# Patient Record
Sex: MALE | Race: BLACK OR AFRICAN AMERICAN | Employment: OTHER | URBAN - METROPOLITAN AREA
[De-identification: names, ages, dates, MRNs, and addresses within clinical notes are randomized per-mention and may not be internally consistent; named-entity substitution may affect disease eponyms.]

---

## 2018-09-28 ENCOUNTER — OFFICE VISIT (OUTPATIENT)
Dept: HEMATOLOGY/ONCOLOGY | Facility: CLINIC | Age: 68
End: 2018-09-28
Payer: OTHER GOVERNMENT

## 2018-09-28 VITALS
OXYGEN SATURATION: 97 % | HEIGHT: 68 IN | RESPIRATION RATE: 18 BRPM | WEIGHT: 147.69 LBS | DIASTOLIC BLOOD PRESSURE: 82 MMHG | SYSTOLIC BLOOD PRESSURE: 130 MMHG | BODY MASS INDEX: 22.38 KG/M2 | TEMPERATURE: 98 F | HEART RATE: 71 BPM

## 2018-09-28 DIAGNOSIS — E03.9 ACQUIRED HYPOTHYROIDISM: ICD-10-CM

## 2018-09-28 DIAGNOSIS — Z90.02 S/P LARYNGECTOMY: ICD-10-CM

## 2018-09-28 DIAGNOSIS — R91.8 PULMONARY NODULES: ICD-10-CM

## 2018-09-28 DIAGNOSIS — Z93.1 STATUS POST INSERTION OF PERCUTANEOUS ENDOSCOPIC GASTROSTOMY (PEG) TUBE: ICD-10-CM

## 2018-09-28 DIAGNOSIS — Z85.21 HISTORY OF CANCER OF LARYNX: ICD-10-CM

## 2018-09-28 PROCEDURE — 99205 OFFICE O/P NEW HI 60 MIN: CPT | Mod: S$PBB,,, | Performed by: INTERNAL MEDICINE

## 2018-09-28 PROCEDURE — 99999 PR PBB SHADOW E&M-NEW PATIENT-LVL III: CPT | Mod: PBBFAC,,, | Performed by: INTERNAL MEDICINE

## 2018-09-28 PROCEDURE — 99203 OFFICE O/P NEW LOW 30 MIN: CPT | Mod: PBBFAC | Performed by: INTERNAL MEDICINE

## 2018-09-28 RX ORDER — IBUPROFEN 600 MG/1
600 TABLET ORAL
COMMUNITY
End: 2018-09-28 | Stop reason: CLARIF

## 2018-09-28 RX ORDER — IBUPROFEN 200 MG
200 TABLET ORAL EVERY 6 HOURS PRN
COMMUNITY

## 2018-09-28 NOTE — PROGRESS NOTES
MAIN COMPLAINT:  We are [facility] by the [facility] to evaluate this 68-year-old   -American gentleman in regard to his history of recurrent laryngeal   cancer.    HISTORY OF PRESENT ILLNESS:  This is a 68-year-old American gentleman who tells   me that in 1999 he was diagnosed as having a localized cancer of the vocal cords   and underwent a complete laryngectomy    Apparently, he had local recurrence in   2015.  He required radiation and chemotherapy.  The patient tells me that recent   lab tests have showed he is hypothyroid and he was prescribed Synthroid.  He   has not taken it because he cannot swallow and is unsure whether he could put it   down the PEG tube.    He seems to have had a somewhat erratic oncologic followup.    He says he had not seen an oncologist for the last eight months and he requested   that he be sent to one.  He lives in the [facility] and this is the nearest   place with an oncologist.    The patient says that following the radiation and chemotherapy, he has been left   with inability to swallow; therefore, he has a PEG tube.  He talks by using a   special voice amplifier.    He says he had two cycles of chemotherapy, but does not know the type.  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    CT scans have shown that he has multiple pulmonary nodules at least since 2016,   but he tells me that subsequent CTs and PET scans have showed stability in size   and number.    ALLERGIES:  None.    MEDICATIONS:  Ibuprofen.    PREVIOUS SURGERIES:  Laryngectomy in 1999.  PEG tube.    SOCIAL HISTORY:  He is .  has no children.  He lives alone a few miles   from [place], [place].  He smoked off and on briefly, but has not smoked   since 1998.  He denies drinking.  He was a .    FAMILY HISTORY:  No cancer or diabetes.  Several uncles have heart attacks.    PAST MEDICAL HISTORY:  1.  Status post total laryngectomy for cancer of the vocal cord in  1999.  2.  Recurrence in 2015, treated with chemo and radiation therapy with apparently   good response.  3.  History of hypothyroidism.    REVIEW OF SYSTEMS:  GENERAL:  No weight loss or fatigue.  EYE:  No vision problems.  OROPHARYNX:  The patient is unable to swallow and has a PEG tube.  He is very   hoarse and communicates by using a voice amplifier.  LUNGS:  No shortness of breath or coughing.  CARDIOVASCULAR:  No chest pains or palpitation.  GASTROINTESTINAL:  No nausea, vomiting or diarrhea.  He has a PEG tube.  GENITOURINARY:  No hematuria, kidney stones or kidney or bladder problems.  NEUROLOGIC:  No headaches or seizures.  PSYCHIATRIC:  No mood swings or depression.    PHYSICAL EXAMINATION:  GENERAL:  He was well groomed.  He interacted normally during the interview.  He   communicated using a voice amplifier.  EYES:  No jaundice or paleness.  OROPHARYNX:No exudates,  no ulcers.  ENDOCRINE:  No palpable thyroid.  LYMPHATIC:  No cervical or supraclavicular adenopathy.  NECK:  No jugular venous distension or masses.  LUNGS:  Clear, well ventilated without rales, wheezes, or rhonchi.  CARDIOVASCULAR:  The heart was rhythmic.  There were no murmurs or gallops.  ABDOMEN:  Soft.  No obvious hepatosplenomegaly, guarding or rebound.  EXTREMITIES:  No edema.  Dorsalis pedis and posterior tibialis pulses.  SKIN:  No rashes or ecchymosis.  NEUROLOGIC:  Coordination, strength and gait were normal.  PSYCHIATRIC:  Mood was appropriate.  Lucid and oriented x3.    DISCUSSION:  The information regarding his treatment is incomplete.  We will   request information regarding his chemoradiation therapy be given to us along   with a copy of his most recent biopsy.    He needs to have a CBC, CMP, T4, TSH and CT scans of the neck and lungs.    He tells me that all his lab tests and imaging studies have to be done through   the [facility], so we will give him a handwritten request for   those lab tests and imaging studies    to be  done.  I will see him in two to three weeks once he completes the tests.      MIKALF/IN  dd: 09/28/2018 14:34:10 (CDT)  td: 09/28/2018 15:35:17 (CDT)  Doc ID   #[id number]  Job ID #[id number]    CC:

## 2021-01-05 ENCOUNTER — HISTORICAL (OUTPATIENT)
Dept: ADMINISTRATIVE | Facility: HOSPITAL | Age: 71
End: 2021-01-05

## 2021-01-05 LAB
ABS NEUT (OLG): 0.88 X10(3)/MCL (ref 2.1–9.2)
ALBUMIN SERPL-MCNC: 3.8 GM/DL (ref 3.4–4.8)
ALBUMIN/GLOB SERPL: 1.1 RATIO (ref 1.1–2)
ALP SERPL-CCNC: 65 UNIT/L (ref 40–150)
ALT SERPL-CCNC: 36 UNIT/L (ref 0–55)
ANISOCYTOSIS BLD QL SMEAR: ABNORMAL
AST SERPL-CCNC: 46 UNIT/L (ref 5–34)
BASOPHILS NFR BLD MANUAL: 0 %
BILIRUB SERPL-MCNC: 0.8 MG/DL
BILIRUBIN DIRECT+TOT PNL SERPL-MCNC: 0.4 MG/DL (ref 0–0.5)
BILIRUBIN DIRECT+TOT PNL SERPL-MCNC: 0.4 MG/DL (ref 0–0.8)
BUN SERPL-MCNC: 25 MG/DL (ref 8.4–25.7)
CALCIUM SERPL-MCNC: 9.3 MG/DL (ref 8.8–10)
CHLORIDE SERPL-SCNC: 103 MMOL/L (ref 98–107)
CO2 SERPL-SCNC: 30 MMOL/L (ref 23–31)
CREAT SERPL-MCNC: 1.02 MG/DL (ref 0.73–1.18)
EOSINOPHIL NFR BLD MANUAL: 4 %
ERYTHROCYTE [DISTWIDTH] IN BLOOD BY AUTOMATED COUNT: 12.7 % (ref 11.5–14.5)
GLOBULIN SER-MCNC: 3.5 GM/DL (ref 2.4–3.5)
GLUCOSE SERPL-MCNC: 68 MG/DL (ref 82–115)
GRANULOCYTES NFR BLD MANUAL: 60 % (ref 43–75)
HAV IGM SERPL QL IA: NONREACTIVE
HBV CORE IGM SERPL QL IA: NONREACTIVE
HBV SURFACE AG SERPL QL IA: NONREACTIVE
HCT VFR BLD AUTO: 42 % (ref 40–51)
HCV AB SERPL QL IA: NONREACTIVE
HGB BLD-MCNC: 13.1 GM/DL (ref 13.5–17.5)
HIV 1+2 AB+HIV1 P24 AG SERPL QL IA: NONREACTIVE
LYMPHOCYTES NFR BLD MANUAL: 16 % (ref 20.5–51.1)
MCH RBC QN AUTO: 28.5 PG (ref 26–34)
MCHC RBC AUTO-ENTMCNC: 31.2 GM/DL (ref 31–37)
MCV RBC AUTO: 91.5 FL (ref 80–100)
MONOCYTES NFR BLD MANUAL: 12 % (ref 2–9)
NEUTS BAND NFR BLD MANUAL: 8 % (ref 0–10)
PLATELET # BLD AUTO: 145 X10(3)/MCL (ref 130–400)
PLATELET # BLD EST: ADEQUATE 10*3/UL
PMV BLD AUTO: 13.5 FL (ref 7.4–10.4)
POTASSIUM SERPL-SCNC: 4.7 MMOL/L (ref 3.5–5.1)
PROT SERPL-MCNC: 7.3 GM/DL (ref 5.8–7.6)
RBC # BLD AUTO: 4.59 X10(6)/MCL (ref 4.5–5.9)
RBC MORPH BLD: ABNORMAL
SODIUM SERPL-SCNC: 140 MMOL/L (ref 136–145)
WBC # SPEC AUTO: 1.6 X10(3)/MCL (ref 4.5–11)

## 2021-02-02 ENCOUNTER — HISTORICAL (OUTPATIENT)
Dept: ENDOSCOPY | Facility: HOSPITAL | Age: 71
End: 2021-02-02

## 2021-02-02 LAB
ABS NEUT (OLG): 0.77 X10(3)/MCL (ref 2.1–9.2)
ANISOCYTOSIS BLD QL SMEAR: NORMAL
BASOPHILS # BLD AUTO: 0 X10(3)/MCL (ref 0–0.2)
BASOPHILS NFR BLD AUTO: 1 %
BASOPHILS NFR BLD MANUAL: 0 %
EOSINOPHIL # BLD AUTO: 0 X10(3)/MCL (ref 0–0.9)
EOSINOPHIL NFR BLD AUTO: 2 %
EOSINOPHIL NFR BLD MANUAL: 2 %
ERYTHROCYTE [DISTWIDTH] IN BLOOD BY AUTOMATED COUNT: 12.6 % (ref 11.5–14.5)
GRANULOCYTES NFR BLD MANUAL: 64 % (ref 43–75)
HCT VFR BLD AUTO: 43.8 % (ref 40–51)
HGB BLD-MCNC: 13.7 GM/DL (ref 13.5–17.5)
INR PPP: 0.97 (ref 0.9–1.2)
LYMPHOCYTES # BLD AUTO: 0.5 X10(3)/MCL (ref 0.6–4.6)
LYMPHOCYTES NFR BLD AUTO: 34 %
LYMPHOCYTES NFR BLD MANUAL: 26 % (ref 20.5–51.1)
MCH RBC QN AUTO: 28.2 PG (ref 26–34)
MCHC RBC AUTO-ENTMCNC: 31.3 GM/DL (ref 31–37)
MCV RBC AUTO: 90.1 FL (ref 80–100)
MONOCYTES # BLD AUTO: 0.2 X10(3)/MCL (ref 0.1–1.3)
MONOCYTES NFR BLD AUTO: 14 %
MONOCYTES NFR BLD MANUAL: 8 % (ref 2–9)
NEUTROPHILS # BLD AUTO: 0.77 X10(3)/MCL (ref 2.1–9.2)
NEUTROPHILS NFR BLD AUTO: 49 %
PLATELET # BLD AUTO: 153 X10(3)/MCL (ref 130–400)
PLATELET # BLD EST: NORMAL 10*3/UL
PMV BLD AUTO: 12.6 FL (ref 7.4–10.4)
PROTHROMBIN TIME: 12.5 SECOND(S) (ref 11.9–14.4)
RBC # BLD AUTO: 4.86 X10(6)/MCL (ref 4.5–5.9)
WBC # SPEC AUTO: 1.6 X10(3)/MCL (ref 4.5–11)

## 2021-02-11 ENCOUNTER — HISTORICAL (OUTPATIENT)
Dept: ADMINISTRATIVE | Facility: HOSPITAL | Age: 71
End: 2021-02-11

## 2021-02-11 LAB — VIT B12 SERPL-MCNC: 709 PG/ML (ref 213–816)

## 2021-06-01 ENCOUNTER — HISTORICAL (OUTPATIENT)
Dept: HEMATOLOGY/ONCOLOGY | Facility: CLINIC | Age: 71
End: 2021-06-01

## 2021-12-10 ENCOUNTER — HISTORICAL (OUTPATIENT)
Dept: ADMINISTRATIVE | Facility: HOSPITAL | Age: 71
End: 2021-12-10

## 2021-12-10 LAB
ABS NEUT (OLG): 1.01 X10(3)/MCL (ref 2.1–9.2)
ALBUMIN SERPL-MCNC: 3.4 GM/DL (ref 3.4–4.8)
ALBUMIN/GLOB SERPL: 1 RATIO (ref 1.1–2)
ALP SERPL-CCNC: 73 UNIT/L (ref 40–150)
ALT SERPL-CCNC: 43 UNIT/L (ref 0–55)
AST SERPL-CCNC: 50 UNIT/L (ref 5–34)
BASOPHILS # BLD AUTO: 0 X10(3)/MCL (ref 0–0.2)
BASOPHILS NFR BLD AUTO: 1 %
BILIRUB SERPL-MCNC: 0.8 MG/DL
BILIRUBIN DIRECT+TOT PNL SERPL-MCNC: 0.3 MG/DL (ref 0–0.5)
BILIRUBIN DIRECT+TOT PNL SERPL-MCNC: 0.5 MG/DL (ref 0–0.8)
BUN SERPL-MCNC: 21.6 MG/DL (ref 8.4–25.7)
CALCIUM SERPL-MCNC: 9.3 MG/DL (ref 8.7–10.5)
CHLORIDE SERPL-SCNC: 104 MMOL/L (ref 98–107)
CO2 SERPL-SCNC: 29 MMOL/L (ref 23–31)
CREAT SERPL-MCNC: 0.97 MG/DL (ref 0.73–1.18)
EOSINOPHIL # BLD AUTO: 0 X10(3)/MCL (ref 0–0.9)
EOSINOPHIL NFR BLD AUTO: 1 %
ERYTHROCYTE [DISTWIDTH] IN BLOOD BY AUTOMATED COUNT: 12.5 % (ref 11.5–14.5)
GLOBULIN SER-MCNC: 3.3 GM/DL (ref 2.4–3.5)
GLUCOSE SERPL-MCNC: 87 MG/DL (ref 82–115)
HCT VFR BLD AUTO: 38.7 % (ref 40–51)
HGB BLD-MCNC: 12.4 GM/DL (ref 13.5–17.5)
LYMPHOCYTES # BLD AUTO: 0.3 X10(3)/MCL (ref 0.6–4.6)
LYMPHOCYTES NFR BLD AUTO: 18 %
MCH RBC QN AUTO: 29.1 PG (ref 26–34)
MCHC RBC AUTO-ENTMCNC: 32 GM/DL (ref 31–37)
MCV RBC AUTO: 90.8 FL (ref 80–100)
MONOCYTES # BLD AUTO: 0.2 X10(3)/MCL (ref 0.1–1.3)
MONOCYTES NFR BLD AUTO: 16 %
NEUTROPHILS # BLD AUTO: 1.01 X10(3)/MCL (ref 2.1–9.2)
NEUTROPHILS NFR BLD AUTO: 64 %
NRBC BLD AUTO-RTO: 0 % (ref 0–0.2)
PLATELET # BLD AUTO: 139 X10(3)/MCL (ref 130–400)
PLATELET # BLD EST: ADEQUATE 10*3/UL
PMV BLD AUTO: 11.9 FL (ref 7.4–10.4)
POTASSIUM SERPL-SCNC: 4.6 MMOL/L (ref 3.5–5.1)
PROT SERPL-MCNC: 6.7 GM/DL (ref 5.8–7.6)
RBC # BLD AUTO: 4.26 X10(6)/MCL (ref 4.5–5.9)
RBC MORPH BLD: NORMAL
SODIUM SERPL-SCNC: 137 MMOL/L (ref 136–145)
WBC # SPEC AUTO: 1.6 X10(3)/MCL (ref 4.5–11)

## 2022-04-11 ENCOUNTER — HISTORICAL (OUTPATIENT)
Dept: ADMINISTRATIVE | Facility: HOSPITAL | Age: 72
End: 2022-04-11
Payer: OTHER GOVERNMENT

## 2022-04-25 VITALS
DIASTOLIC BLOOD PRESSURE: 70 MMHG | WEIGHT: 120.13 LBS | BODY MASS INDEX: 17.79 KG/M2 | SYSTOLIC BLOOD PRESSURE: 115 MMHG | HEIGHT: 69 IN

## 2022-09-14 ENCOUNTER — OFFICE VISIT (OUTPATIENT)
Dept: OTOLARYNGOLOGY | Facility: CLINIC | Age: 72
End: 2022-09-14
Payer: MEDICARE

## 2022-09-14 VITALS
DIASTOLIC BLOOD PRESSURE: 64 MMHG | BODY MASS INDEX: 17.77 KG/M2 | WEIGHT: 120 LBS | HEART RATE: 84 BPM | SYSTOLIC BLOOD PRESSURE: 109 MMHG | TEMPERATURE: 98 F

## 2022-09-14 DIAGNOSIS — Z85.21 HISTORY OF CANCER OF LARYNX: Primary | ICD-10-CM

## 2022-09-14 PROCEDURE — 99213 OFFICE O/P EST LOW 20 MIN: CPT | Mod: PBBFAC,25 | Performed by: OTOLARYNGOLOGY

## 2022-09-14 PROCEDURE — 31575 DIAGNOSTIC LARYNGOSCOPY: CPT | Mod: PBBFAC | Performed by: OTOLARYNGOLOGY

## 2022-09-14 RX ORDER — LIDOCAINE HYDROCHLORIDE 40 MG/ML
1 INJECTION, SOLUTION RETROBULBAR
Status: DISCONTINUED | OUTPATIENT
Start: 2022-09-14 | End: 2023-09-14

## 2022-09-14 NOTE — PROGRESS NOTES
Patient Name: Toro Bains   YOB: 1950     Chief Complaint:   Chief Complaint   Patient presents with    ca surveillance        History of Present Illness:  8/6/2020: Toro Bains is a 70 Years old Male that presents to establish ENT care and follow for his cancer surveillance. history obtained from patient and from chart review in [facility] in . Patient initially with laryngeal cancer that underwent right partial laryngectomy in 1999. Following, he had a recurrence, T3N1M0 that underwent CRT that finished in 2016. Since that time, he has been tracheostomy and PEG dependent. He had seen Dr. Crawford at [facility] for evaluation where he was found to have complete laryngeal/tracheal stenosis and esophageal stenosis. There was a soft tissue density about 2 cm in his right neck through the thyrohyoid membrane, but subsequent PET did not show any avidity. It has been stable since. Since that time, he continues to use trach and PEG without issues. He has a 6.0 CFS in place. He takes 2Cal via PEG. He uses an intraoral electrolarynx. He does note that is having trouble opening his mouth as wide as before. He does jaw range of motion exercise. He denies any sore throat, otalgia, neck masses, fevers, chills, or night sweats. He takes synthroid for hypothyroidism. He states he had labs drawn recently.    1/28/2021: 71y/o male who follows with clinic here at recommendation of primary care physician due to complaints of overheating and over-exertion. Pt reports that he sometimes over-exerts himself when working in the yard or working on different things, and was considering if this could be related to his tracheostomy. States that more recently he has learned to pace himself and he is no longer having any issues with this. Pt states that he does not desire any changes to his tracheostomy at this point and that he is doing well overall with no complaints. He has a 6.0 CFS that he wears regularly and  reports changing 2-3 times a year himself. States that he is using 2cal via PEG and also tolerating this well. Uses intraoral electrolarynx and tolerating well. 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 Denies any other complaints at this time, ROS as below.    11/3/21:: This is a follow-up visit, I'm dictating on 12/2/21 the note was lost. Not recalling having any further problems.    034/1/22: Here today for Cancer Surveillance. His only complaint is that the mucus remains thick in his throat and has to work on getting it out. He is now 6 years post treatment. His weight has remained stable, adequate nourishment through his PEG. He has a functionless larynx.    September 14, 2022:   72-year-old male presents today for follow-up of laryngeal squamous cell carcinoma.  Patient is doing well at this time.  He has no complaints.  He does continue to rely on his PEG tube feeds for his nutrition in his having no problems with this.  In regards to his tracheostomy he does change his inner and outer cannula on an average of every 3 months and does this himself without problems.  He does communicate well with his electrolarynx.    Speaking with him he did indicate that he is on thyroid medication for hypothyroidism and this is monitored by his primary care provider.  He has no other new problems today.    Past Medical History:  Past Medical History:   Diagnosis Date    Arthritis     Hypertension     Laryngeal cancer 1999     Past Surgical History:   Procedure Laterality Date    COLONOSCOPY      GASTROSTOMY TUBE PLACEMENT      TRACHEAL SURGERY         Review of Systems:  Unremarkable except as mentioned above.    Current Medications:  Current Outpatient Medications   Medication Sig    ibuprofen (ADVIL,MOTRIN) 200 MG tablet Take 200 mg by mouth every 6 (six) hours as needed for Pain.     Current  Facility-Administered Medications   Medication    LIDOcaine (PF) 40 mg/mL (4 %) injection 1 mL    phenylephrine HCL 1% nasal spray 1 spray        Allergies:  Review of patient's allergies indicates:  No Known Allergies     Physical Exam:  Vital signs:   Vitals:    09/14/22 1134   BP: 109/64   Pulse: 84   Temp: 97.8 °F (36.6 °C)   Weight: 54.4 kg (120 lb)   General:  Well-developed well-nourished male in no acute distress.  Patient communicates well using his electrolarynx.    Head and face:  Normocephalic.  No facial lesions.  No temporomandibular joint tenderness or click.  Ears:  Right ear-auricle is normally developed.  External auditory canal is clear.  Tympanic membrane is nonerythematous.  No middle ear effusion.  Left ear-auricle is normally developed.  External auditory canal is clear.  Tympanic membrane is nonerythematous.  No middle ear effusion.  Nose:  Nasal dorsum is unremarkable.  No significant septal deviation.  No significant intranasal congestion.  Secretions are clear.  Oral cavity and oropharynx:  Tongue and floor mouth are without lesions.  Mucosa is moist but with thick secretions.  No pharyngeal erythema or exudates.  No oropharyngeal masses.  No tonsillar hypertrophy. Mild trismus.  Neck:  6. Cuffless Shiley tracheostomy tube is in place.  No palpable masses.  No adenopathy.  Parotid and submandibular glands are normal to palpation.  Eyes:  Extraocular muscles intact.  No nystagmus.  No exophthalmos or enophthalmos.  Neurologic:  Alert and oriented.  Cranial nerves 2-12 are grossly normal.    Procedure note: Flexible laryngoscopy.  The nose was prepped with 1% phenyleprine nasal spray and tetracaine topically. The flexible fiberoptic laryngoscope was introduced into the right nostril and advanced. Examination of the nose showed the above mentioned findings. Examination of the nasopharynx showed no nasopharyngeal masses or eustachian tube obstruction. Examination of the base of tongue showed  no masses or lesions. Laryngeal examination showed no laryngeal masses. Examination of the hypopharynx showed no masses.  No pooling of secretions in the piriform sinuses.    Assessment/Plan:   72-year-old male with history of laryngeal cancer s/p right hemilaryngectomy in 1999, recurrence in larynx in 2015 s/p CRT completed 2016. 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 Patient with completed laryngeal / tracheal stenosis and trach dependence with 6.0 CFS in place. Esophageal stenosis present and patient is PEG dependent. Last PET 1/2020 along with CT neck and thorax.  Patient is without evident disease.      Plan:   Follow up in 1 year with repeat fiberoptic laryngoscopy.      Tim Lees M.D.

## 2023-09-14 ENCOUNTER — OFFICE VISIT (OUTPATIENT)
Dept: OTOLARYNGOLOGY | Facility: CLINIC | Age: 73
End: 2023-09-14
Payer: OTHER GOVERNMENT

## 2023-09-14 VITALS
WEIGHT: 115 LBS | DIASTOLIC BLOOD PRESSURE: 82 MMHG | HEIGHT: 68 IN | SYSTOLIC BLOOD PRESSURE: 129 MMHG | HEART RATE: 71 BPM | TEMPERATURE: 98 F | BODY MASS INDEX: 17.43 KG/M2 | RESPIRATION RATE: 18 BRPM

## 2023-09-14 DIAGNOSIS — J20.9 ACUTE BRONCHITIS, UNSPECIFIED ORGANISM: ICD-10-CM

## 2023-09-14 DIAGNOSIS — Z85.21 HISTORY OF CANCER OF LARYNX: Primary | ICD-10-CM

## 2023-09-14 PROCEDURE — 31575 DIAGNOSTIC LARYNGOSCOPY: CPT | Mod: PBBFAC | Performed by: OTOLARYNGOLOGY

## 2023-09-14 PROCEDURE — 99214 OFFICE O/P EST MOD 30 MIN: CPT | Mod: PBBFAC,25 | Performed by: OTOLARYNGOLOGY

## 2023-09-14 RX ORDER — LEVOTHYROXINE SODIUM 50 UG/1
1 TABLET ORAL EVERY MORNING
COMMUNITY

## 2023-09-14 RX ORDER — MULTIVITAMIN
1 TABLET ORAL EVERY MORNING
COMMUNITY

## 2023-09-14 RX ORDER — LIDOCAINE HYDROCHLORIDE 40 MG/ML
1 INJECTION, SOLUTION RETROBULBAR
Status: DISPENSED | OUTPATIENT
Start: 2023-09-14

## 2023-09-14 RX ORDER — MULTIVITAMIN WITH IRON
TABLET ORAL
COMMUNITY
Start: 2023-02-16

## 2023-09-14 RX ORDER — AMOXICILLIN 400 MG/5ML
10 POWDER, FOR SUSPENSION ORAL 2 TIMES DAILY
Qty: 200 ML | Refills: 0 | Status: SHIPPED | OUTPATIENT
Start: 2023-09-14 | End: 2023-09-24

## 2023-09-14 NOTE — PROGRESS NOTES
The scope used for the exam was:  Flexible scope ENF-P4  Serial Number:  1)    [id number]    [x]   2)    [id number]    []   3)    [id number]    []   4)    [id number]    []   5)    [id number]    []   6)    [id number]    []       The scope used for the exam was:  Rigid scope   Serial Number:  1)   [id number]    []   2)   [id number]    []   3)   [id number]    []   4)    3384   []   5)    0824   []   6)    5554   []     7)   7425    []   8)   2240    []   9)   1109    []

## 2023-09-14 NOTE — PROGRESS NOTES
Patient Name: Toro Bains   YOB: 1950     Chief Complaint:   No chief complaint on file.       History of Present Illness:  8/6/2020: Toro Bains is a 70 Years old Male that presents to establish ENT care and follow for his cancer surveillance. history obtained from patient and from chart review in [facility] in . Patient initially with laryngeal cancer that underwent right partial laryngectomy in 1999. Following, he had a recurrence, T3N1M0 that underwent CRT that finished in 2016. Since that time, he has been tracheostomy and PEG dependent. He had seen Dr. Crawford at [facility] for evaluation where he was found to have complete laryngeal/tracheal stenosis and esophageal stenosis. There was a soft tissue density about 2 cm in his right neck through the thyrohyoid membrane, but subsequent PET did not show any avidity. It has been stable since. Since that time, he continues to use trach and PEG without issues. He has a 6.0 CFS in place. He takes 2Cal via PEG. He uses an intraoral electrolarynx. He does note that is having trouble opening his mouth as wide as before. He does jaw range of motion exercise. He denies any sore throat, otalgia, neck masses, fevers, chills, or night sweats. He takes synthroid for hypothyroidism. He states he had labs drawn recently.    1/28/2021: 71y/o male who follows with clinic here at recommendation of primary care physician due to complaints of overheating and over-exertion. Pt reports that he sometimes over-exerts himself when working in the yard or working on different things, and was considering if this could be related to his tracheostomy. States that more recently he has learned to pace himself and he is no longer having any issues with this. Pt states that he does not desire any changes to his tracheostomy at this point and that he is doing well overall with no complaints. He has a 6.0 CFS that he wears regularly and reports changing 2-3 times a  year himself. States that he is using 2cal via PEG and also tolerating this well. Uses intraoral electrolarynx and tolerating well. 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 Denies any other complaints at this time, ROS as below.    11/3/21:: This is a follow-up visit, I'm dictating on 12/2/21 the note was lost. Not recalling having any further problems.    034/1/22: Here today for Cancer Surveillance. His only complaint is that the mucus remains thick in his throat and has to work on getting it out. He is now 6 years post treatment. His weight has remained stable, adequate nourishment through his PEG. He has a functionless larynx.    September 14, 2022:   72-year-old male presents today for follow-up of laryngeal squamous cell carcinoma.  Patient is doing well at this time.  He has no complaints.  He does continue to rely on his PEG tube feeds for his nutrition in his having no problems with this.  In regards to his tracheostomy he does change his inner and outer cannula on an average of every 3 months and does this himself without problems.  He does communicate well with his electrolarynx.    Speaking with him he did indicate that he is on thyroid medication for hypothyroidism and this is monitored by his primary care provider.  He has no other new problems today.    September 14, 2023:   Patient presents today for follow-up of his laryngeal squamous cell carcinoma.  Patient continues to do well.  His only concern is that for the past week he is had a cough which is productive of some yellow sputum through his tracheostomy tube.  He is not had any fever, shortness of breath, hemoptysis, or significant malaise.  He is not had treatment for this as of yet.  Patient does continue to changes tracheostomy by himself at home.  He does continue to rely on his PEG tube feeds for nutrition and is having  "no problems with this.  He will sometimes eat things such as ice cream for pleasure.  No other new problems today.    Past Medical History:  Past Medical History:   Diagnosis Date    Arthritis     Hypertension     Laryngeal cancer 1999     Past Surgical History:   Procedure Laterality Date    COLONOSCOPY      GASTROSTOMY TUBE PLACEMENT      TRACHEAL SURGERY         Review of Systems:  Unremarkable except as mentioned above.    Current Medications:  Current Outpatient Medications   Medication Sig    ibuprofen (ADVIL,MOTRIN) 200 MG tablet Take 200 mg by mouth every 6 (six) hours as needed for Pain.    levothyroxine (SYNTHROID) 50 MCG tablet Take 1 tablet by mouth every morning.    multivitamin with folic acid 400 mcg Tab Take 1 tablet by mouth every morning.    multivitamin with iron Tab TAKE 1 CAP/TAB BY MOUTH EVERY DAY FOR VITAMIN SUPPLEMENT     Current Facility-Administered Medications   Medication    LIDOcaine (PF) 40 mg/mL (4 %) injection 1 mL    phenylephrine HCL 1% nasal spray 1 spray        Allergies:  Review of patient's allergies indicates:  No Known Allergies     Physical Exam:  Vital signs:   Vitals:    09/14/23 1113   BP: 129/82   BP Location: Right arm   Patient Position: Sitting   BP Method: Medium (Automatic)   Pulse: 71   Resp: 18   Temp: 98.2 °F (36.8 °C)   TempSrc: Oral   Weight: 52.2 kg (115 lb)   Height: 5' 8" (1.727 m)   General:  Well-developed well-nourished male in no acute distress.  Patient communicates well using his electrolarynx.    Head and face:  Normocephalic.  No facial lesions.  No temporomandibular joint tenderness or click.  Ears:  Right ear-auricle is normally developed.  External auditory canal is clear.  Tympanic membrane is nonerythematous.  No middle ear effusion.  Left ear-auricle is normally developed.  External auditory canal is clear.  Tympanic membrane is nonerythematous.  No middle ear effusion.  Nose:  Nasal dorsum is unremarkable.  No significant septal deviation.  No " significant intranasal congestion.  Secretions are clear.  Oral cavity and oropharynx:  Tongue and floor mouth are without lesions.  Mucosa is moist but with thick secretions.  No pharyngeal erythema or exudates.  No oropharyngeal masses.  No tonsillar hypertrophy. Mild trismus.  Neck:  6. Cuffless Shiley tracheostomy tube is in place.  No palpable masses.  No adenopathy.  Parotid and submandibular glands are normal to palpation.  Eyes:  Extraocular muscles intact.  No nystagmus.  No exophthalmos or enophthalmos.  Neurologic:  Alert and oriented.  Cranial nerves 2-12 are grossly normal.    Procedure note: Flexible laryngoscopy.  The nose was prepped with 1% phenyleprine nasal spray and tetracaine topically. The flexible fiberoptic laryngoscope was introduced into the right nostril and advanced. Examination of the nose showed the above mentioned findings. Examination of the nasopharynx showed no nasopharyngeal masses or eustachian tube obstruction. Examination of the base of tongue showed no masses or lesions. Laryngeal examination showed no laryngeal masses. Examination of the hypopharynx showed no masses.  No pooling of secretions in the piriform sinuses.  Examination of the trachea through the tracheostomy tube shows mild tracheal erythema but no edema or masses or lesions down to the level of the olivier.    Assessment/Plan:   72-year-old male with history of laryngeal cancer s/p right hemilaryngectomy in 1999, recurrence in larynx in 2015 s/p CRT completed 2016. 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 Esophageal stenosis present and patient is PEG dependent. Last PET 1/2020 along with CT neck and thorax.  Patient is without evident disease.    Acute bronchitis.    Plan:   Follow up in 1 year with repeat fiberoptic laryngoscopy.  Amoxicillin  suspension 400 mg/5 mL 10 mL per G-tube b.i.d. for 10 days for treatment of his bronchitis.      Tim Lees M.D.

## 2024-09-13 ENCOUNTER — OFFICE VISIT (OUTPATIENT)
Dept: ORTHOPEDICS | Facility: CLINIC | Age: 74
End: 2024-09-13
Payer: OTHER GOVERNMENT

## 2024-09-13 ENCOUNTER — HOSPITAL ENCOUNTER (OUTPATIENT)
Dept: RADIOLOGY | Facility: CLINIC | Age: 74
Discharge: HOME | End: 2024-09-13
Attending: PHYSICIAN ASSISTANT
Payer: OTHER GOVERNMENT

## 2024-09-13 DIAGNOSIS — M25.562 PAIN IN BOTH KNEES, UNSPECIFIED CHRONICITY: Primary | ICD-10-CM

## 2024-09-13 DIAGNOSIS — M25.561 PAIN IN BOTH KNEES, UNSPECIFIED CHRONICITY: ICD-10-CM

## 2024-09-13 DIAGNOSIS — M17.11 PRIMARY OSTEOARTHRITIS OF RIGHT KNEE: ICD-10-CM

## 2024-09-13 DIAGNOSIS — M25.561 PAIN IN BOTH KNEES, UNSPECIFIED CHRONICITY: Primary | ICD-10-CM

## 2024-09-13 DIAGNOSIS — M17.12 PRIMARY OSTEOARTHRITIS OF LEFT KNEE: ICD-10-CM

## 2024-09-13 DIAGNOSIS — M25.562 PAIN IN BOTH KNEES, UNSPECIFIED CHRONICITY: ICD-10-CM

## 2024-09-13 PROCEDURE — 99203 OFFICE O/P NEW LOW 30 MIN: CPT | Mod: ,,, | Performed by: PHYSICIAN ASSISTANT

## 2024-09-13 PROCEDURE — 73564 X-RAY EXAM KNEE 4 OR MORE: CPT | Mod: 50,,, | Performed by: PHYSICIAN ASSISTANT

## 2024-09-18 NOTE — PROGRESS NOTES
"Chief Complaint:   Chief Complaint   Patient presents with    Knee Pain     Patient c/o rosario knee pain. Ongoing for years. Reports the R knee is worse. Reports pain mostly on the medial aspect of the knee. Reports swelling. Ibu to help ease his pain. Hx of cortisone/synvisc years ago with no relief.        History of present illness:    This is a 74 y.o. year old male who complains of bilateral knee pain right greater than left.    Patient was been having pain for a number years he was undergone all conservative treatments including intra-articular injections, modifying activities, anti-inflammatories.    Patient is still at a point we has significant knee pain       Current Outpatient Medications   Medication Sig    ibuprofen (ADVIL,MOTRIN) 200 MG tablet Take 200 mg by mouth every 6 (six) hours as needed for Pain.    levothyroxine (SYNTHROID) 50 MCG tablet Take 1 tablet by mouth every morning.    multivitamin with folic acid 400 mcg Tab Take 1 tablet by mouth every morning.    multivitamin with iron Tab TAKE 1 CAP/TAB BY MOUTH EVERY DAY FOR VITAMIN SUPPLEMENT     Current Facility-Administered Medications   Medication    LIDOcaine (PF) 40 mg/mL (4 %) injection 1 mL    phenylephrine HCL 1% nasal spray 1 spray       Review of Systems:    Constitution:   Denies chills, fever, and sweats.  HENT:   Denies headaches or blurry vision.  Cardiovascular:  Denies chest pain or irregular heart beat.  Respiratory:   Denies cough or shortness of breath.  Gastrointestinal:  Denies abdominal pain, nausea, or vomiting.  Musculoskeletal:   Denies muscle cramps.  Neurological:   Denies dizziness or focal weakness.  Psychiatric/Behavior: Normal mental status.  Hematology/Lymph:  Denies bleeding problem or easy bruising/bleeding.  Skin:    Denies rash or suspicious lesions.    Examination:    Vital Signs:    Vitals:    09/13/24 1014   BP: (P) 105/66   Pulse: (P) 67   Weight: (P) 52.2 kg (115 lb 1.3 oz)   Height: (P) 5' 8" (1.727 m) "       Body mass index is 17.5 kg/m² (pended).    Constitution:   Well-developed, well nourished patient in no acute distress.  Neurological:   Alert and oriented x 3 and cooperative to examination.     Psychiatric/Behavior: Normal mental status.  Respiratory:   No shortness of breath.  Eyes:    Extraoccular muscles intact  Skin:    No scars, rash or suspicious lesions.    Physical Exam:       General Musculoskeletal Exam   Gait: antalgic       Bilateral Knee Exam     Inspection   Erythema: absent  Effusion:  Absent  Deformity: present  Bruising: absent    Tenderness   The patient is tender to palpation of the medial and patellofemoral joint line    Crepitus   The patient has crepitus with ROM    Range of Motion   Extension: abnormal   Flexion: abnormal   5-120    Tests   Meniscus   Carter:  Negative  Ligament Examination   MCL - Valgus: normal (0 to 2mm)  LCL - Varus: normal  Patella   Passive Patellar Tilt: neutral    Other   Sensation: normal    Comments:  Varus deformity    Muscle Strength   Right Lower Extremity   Quadriceps:  5/5   Hamstrin/5     Vascular Exam     Right Pulses  Dorsalis Pedis:      2+  Posterior Tibial:      2+      Imaging: X-rays ordered and images interpreted today personally by me of bilateral knees four views each knee.    Patient was significant degenerative joint disease of both knees with medial compartment being bone-on-bone with hypertrophic osteophyte formation.    The patellofemoral compartment it was significantly narrowed with osteophyte formation.    Varus deformity        Assessment: Pain in both knees, unspecified chronicity  -     X-Ray Knee Complete 4 Or More Views Bilat; Future; Expected date: 2024    Primary osteoarthritis of left knee    Primary osteoarthritis of right knee         Plan:  This point the patient wishes to proceed with surgical intervention for his bilateral knee pain.    He was going to be referred to Dr. Tiffanie nair for evaluation for total  knee arthroplasty.    Patient does have a tracheostomy which he was had for years and we will have to get clearances from his physicians prior to surgical intervention          DISCLAIMER: This note may have been dictated using voice recognition software and may contain grammatical errors.     NOTE: Consult report sent to referring provider via [facility]MR.

## 2024-09-19 ENCOUNTER — OFFICE VISIT (OUTPATIENT)
Dept: ORTHOPEDICS | Facility: CLINIC | Age: 74
End: 2024-09-19
Payer: OTHER GOVERNMENT

## 2024-09-19 VITALS
BODY MASS INDEX: 18.01 KG/M2 | HEART RATE: 70 BPM | SYSTOLIC BLOOD PRESSURE: 113 MMHG | DIASTOLIC BLOOD PRESSURE: 75 MMHG | WEIGHT: 118.81 LBS | HEIGHT: 68 IN

## 2024-09-19 DIAGNOSIS — M17.12 PRIMARY OSTEOARTHRITIS OF LEFT KNEE: ICD-10-CM

## 2024-09-19 DIAGNOSIS — M17.11 PRIMARY OSTEOARTHRITIS OF RIGHT KNEE: Primary | ICD-10-CM

## 2024-09-19 NOTE — PROGRESS NOTES
Chief Complaint:   Chief Complaint   Patient presents with    Right Knee - Pain     B/L Knee pain---Pt states he tried Cortisone injection and Synvisc series about 10 years ago. Pt states the injections doesn't work anymore. Pt would like to discuss a bilateral knee replacement.        History of present illness:    History of Present Illness  The patient presents for evaluation of his knees.    He has been experiencing knee issues for the past 30 years, which he attributes to arthritis. The condition has not been consistent over the last 3 or 4 years. His right knee is causing him more discomfort than the left. [facility] and cortisone [facility]. He also tried a gel treatment once a week about 10 years ago, but it did not provide relief. It has been a significant amount of time since his last injection. He is considering staying in a rehabilitation facility for two weeks post-surgery.    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 He has a trach.    Past Medical History:   Diagnosis Date    Arthritis     Hypertension     Laryngeal cancer 1999       Past Surgical History:   Procedure Laterality Date    COLONOSCOPY      GASTROSTOMY TUBE PLACEMENT      TRACHEAL SURGERY         Current Outpatient Medications   Medication Sig    ibuprofen (ADVIL,MOTRIN) 200 MG tablet Take 200 mg by mouth every 6 (six) hours as needed for Pain.    levothyroxine (SYNTHROID) 50 MCG tablet Take 1 tablet by mouth every morning.    multivitamin with folic acid 400 mcg Tab Take 1 tablet by mouth every morning.    multivitamin with iron Tab TAKE 1 CAP/TAB BY MOUTH EVERY DAY FOR VITAMIN SUPPLEMENT     Current Facility-Administered Medications   Medication    LIDOcaine (PF) 40 mg/mL (4 %) injection 1 mL    phenylephrine HCL 1% nasal spray 1 spray       Review of patient's allergies indicates:  No Known Allergies    Family History   Problem Relation Name Age of Onset    Hypertension  Mother      No Known Problems Father         Social History     Socioeconomic History    Marital status: Unknown   Tobacco Use    Smoking status: Former     Current packs/day: 0.00     Types: Cigarettes     Quit date: 1998     Years since quittin.9    Smokeless tobacco: Never   Substance and Sexual Activity    Alcohol use: No    Drug use: No    Sexual activity: Not Currently           Review of Systems:    Constitution: Negative for chills, fever, and sweats.  Negative for unexplained weight loss.    HENT:  Negative for headaches and blurry vision.    Cardiovascular:Negative for chest pain or irregular heart beat. Negative for hypertension.    Respiratory:  Negative for cough and shortness of breath.    Gastrointestinal: Negative for abdominal pain, heartburn, melena, nausea, and vomitting.    Genitourinary:  Negative bladder incontinence and dysuria.    Musculoskeletal:  See HPI    Neurological: Negative for numbness.    Psychiatric/Behavioral: Negative for depression.  The patient is not nervous/anxious.      Endocrine: Negative for polyuria    Hematologic/Lymphatic: Negative for bleeding problem.  Does not bruise/bleed easily.    Skin: Negative for poor would healing and rash      Physical Examination:    Vital Signs:    Vitals:    24 1204   BP: 113/75   Pulse: 70       Body mass index is 18.06 kg/m².    General: No acute distress, alert and oriented, healthy appearing    HEENT: Head is atraumatic, mucous membranes are moist    Neck: Supples, no JVD.  Patient was has a trach    Cardiovascular: Palpable dorsalis pedis and posterior tibial pulses, regular rate and rhythm to those pulses    Lungs: Breathing non-labored    Skin: no rashes appreciated    Neurologic: Can flex and extend knees, ankles, and toes. Sensation is grossly intact    Bilateral knees:   Both knees are fixed varus deformity.  That has significant limited range of motion.  He can get to extension about 10.  Flexion is limited to  about 95 bilaterally.  Significant crepitus with range of motion.    X-rays:  Four views of bilateral knees reviewed with the patient has end-stage osteoarthritis.  For loss of joint space and bone-on-bone articulation of both knees.  Kellgren Steven grade 4 changes noted to bilateral knees.     Assessment::  Bilateral knee osteoarthritis    Plan:  Discussed all treatment options the patient.  Patient wishes to proceed with total knee arthroplasty to both of his knees however he was good candidate given his significant deformity.  It was felt the patient was can try a 1 followed by the other.  It does have a history of trach as well as radiation and tracheostomy in the past.  [facility]  It was dive into this and discuss this with the anesthesia regarding optimum place for surgical intervention.  Once we get this established, we will go ahead and schedule him.  He was thinking about getting procedure done in late November or early December.    This note was generated with the assistance of ambient listening technology. Verbal consent was obtained by the patient and accompanying visitor(s) for the recording of patient appointment to facilitate this note. I attest to having reviewed and edited the generated note for accuracy, though some syntax or spelling errors may persist. Please contact the author of this note for any clarification.      This note was created using [employer] voice recognition software that occasionally misinterpreted phrases or words.    Consult note is delivered via Epic messaging service.

## 2024-09-24 ENCOUNTER — OFFICE VISIT (OUTPATIENT)
Dept: OTOLARYNGOLOGY | Facility: CLINIC | Age: 74
End: 2024-09-24
Payer: OTHER GOVERNMENT

## 2024-09-24 VITALS
BODY MASS INDEX: 18.01 KG/M2 | SYSTOLIC BLOOD PRESSURE: 122 MMHG | OXYGEN SATURATION: 99 % | TEMPERATURE: 98 F | WEIGHT: 118.81 LBS | RESPIRATION RATE: 18 BRPM | DIASTOLIC BLOOD PRESSURE: 77 MMHG | HEIGHT: 68 IN | HEART RATE: 88 BPM

## 2024-09-24 DIAGNOSIS — Z93.0 TRACHEOSTOMY DEPENDENCE: ICD-10-CM

## 2024-09-24 DIAGNOSIS — R42 DIZZINESS: ICD-10-CM

## 2024-09-24 DIAGNOSIS — Z85.21 HISTORY OF CANCER OF LARYNX: Primary | ICD-10-CM

## 2024-09-24 PROCEDURE — 31575 DIAGNOSTIC LARYNGOSCOPY: CPT | Mod: PBBFAC

## 2024-09-24 PROCEDURE — 99213 OFFICE O/P EST LOW 20 MIN: CPT | Mod: PBBFAC

## 2024-09-24 RX ORDER — LIDOCAINE HYDROCHLORIDE 40 MG/ML
SOLUTION TOPICAL
Status: DISCONTINUED
Start: 2024-09-24 | End: 2024-09-24 | Stop reason: HOSPADM

## 2024-09-24 RX ORDER — INOSITOL/CHOLINE/BIOFLA/VITB,C 500 MG
1 TABLET ORAL DAILY
Qty: 30 TABLET | Refills: 11 | Status: SHIPPED | OUTPATIENT
Start: 2024-09-24 | End: 2025-09-24

## 2024-09-24 NOTE — PROGRESS NOTES
Patient Name: Toro Bains   YOB: 1950     Chief Complaint:   Chief Complaint   Patient presents with    H/O OF CANCER OF LARYNX        History of Present Illness:  8/6/2020: Toro Bains is a 70 Years old Male that presents to establish ENT care and follow for his cancer surveillance. history obtained from patient and from chart review in [facility] in . Patient initially with laryngeal cancer that underwent right partial laryngectomy in 1999. Following, he had a recurrence, T3N1M0 that underwent CRT that finished in 2016. Since that time, he has been tracheostomy and PEG dependent. He had seen Dr. Crawford at [facility] for evaluation where he was found to have complete laryngeal/tracheal stenosis and esophageal stenosis. There was a soft tissue density about 2 cm in his right neck through the thyrohyoid membrane, but subsequent PET did not show any avidity. It has been stable since. Since that time, he continues to use trach and PEG without issues. He has a 6.0 CFS in place. He takes 2Cal via PEG. He uses an intraoral electrolarynx. He does note that is having trouble opening his mouth as wide as before. He does jaw range of motion exercise. He denies any sore throat, otalgia, neck masses, fevers, chills, or night sweats. He takes synthroid for hypothyroidism. He states he had labs drawn recently.    1/28/2021: 71y/o male who follows with clinic here at recommendation of primary care physician due to complaints of overheating and over-exertion. Pt reports that he sometimes over-exerts himself when working in the yard or working on different things, and was considering if this could be related to his tracheostomy. States that more recently he has learned to pace himself and he is no longer having any issues with this. Pt states that he does not desire any changes to his tracheostomy at this point and that he is doing well overall with no complaints. He has a 6.0 CFS that he wears  regularly and reports changing 2-3 times a year himself. States that he is using 2cal via PEG and also tolerating this well. Uses intraoral electrolarynx and tolerating well. 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 Denies any other complaints at this time, ROS as below.    11/3/21:: This is a follow-up visit, I'm dictating on 12/2/21 the note was lost. Not recalling having any further problems.    034/1/22: Here today for Cancer Surveillance. His only complaint is that the mucus remains thick in his throat and has to work on getting it out. He is now 6 years post treatment. His weight has remained stable, adequate nourishment through his PEG. He has a functionless larynx.    September 14, 2022:   72-year-old male presents today for follow-up of laryngeal squamous cell carcinoma.  Patient is doing well at this time.  He has no complaints.  He does continue to rely on his PEG tube feeds for his nutrition in his having no problems with this.  In regards to his tracheostomy he does change his inner and outer cannula on an average of every 3 months and does this himself without problems.  He does communicate well with his electrolarynx.    Speaking with him he did indicate that he is on thyroid medication for hypothyroidism and this is monitored by his primary care provider.  He has no other new problems today.    September 14, 2023:   Patient presents today for follow-up of his laryngeal squamous cell carcinoma.  Patient continues to do well.  His only concern is that for the past week he is had a cough which is productive of some yellow sputum through his tracheostomy tube.  He is not had any fever, shortness of breath, hemoptysis, or significant malaise.  He is not had treatment for this as of yet.  Patient does continue to changes tracheostomy by himself at home.  He does continue to rely on his  PEG tube feeds for nutrition and is having no problems with this.  He will sometimes eat things such as ice cream for pleasure.  No other new problems today.    9/24/24:  Patient presents today for routine follow up of his laryngeal squamous cell carcinoma.  He continues to do well.  He continues to perform his own tracheostomy tube changes in care and reports that he has adequate supplies at home.  Of note he is not wearing inner cannula today he reports that he wears it at night but does not do the day due to having to take it out to clean it frequently.  Counseled him extensively on the importance of wearing an inner cannula at all times and the risks of airway obstruction and ultimately potential death if it is not in place.  He continues to take his PEG feed at home for nutrition without issue, his weight has been stable.  He reports occasional dizziness that has resolved with the over-the-counter vitamin.      Past Medical History:  Past Medical History:   Diagnosis Date    Arthritis     Hypertension     Laryngeal cancer 1999     Past Surgical History:   Procedure Laterality Date    COLONOSCOPY      GASTROSTOMY TUBE PLACEMENT      TRACHEAL SURGERY         Review of Systems:  Unremarkable except as mentioned above.    Current Medications:  Current Outpatient Medications   Medication Sig    ibuprofen (ADVIL,MOTRIN) 200 MG tablet Take 200 mg by mouth every 6 (six) hours as needed for Pain.    levothyroxine (SYNTHROID) 50 MCG tablet Take 1 tablet by mouth every morning.    multivitamin with folic acid 400 mcg Tab Take 1 tablet by mouth every morning.    multivitamin with iron Tab TAKE 1 CAP/TAB BY MOUTH EVERY DAY FOR VITAMIN SUPPLEMENT     Current Facility-Administered Medications   Medication    LIDOcaine (PF) 40 mg/mL (4 %) injection 1 mL    phenylephrine HCL 1% nasal spray 1 spray        Allergies:  Review of patient's allergies indicates:  No Known Allergies     Physical Exam:  Vital signs:   Vitals:     "09/24/24 1148   BP: 122/77   BP Location: Left arm   Patient Position: Sitting   BP Method: Medium (Automatic)   Pulse: 88   Resp: 18   Temp: 98.1 °F (36.7 °C)   TempSrc: Oral   SpO2: 99%   Weight: 53.9 kg (118 lb 13.3 oz)   Height: 5' 8" (1.727 m)   General:  Well-developed well-nourished male in no acute distress.  Patient communicates well using his electrolarynx.    Head and face:  Normocephalic.  No facial lesions.  No temporomandibular joint tenderness or click.  Ears:  Right ear-auricle is normally developed.  External auditory canal is clear.  Tympanic membrane is nonerythematous.  No middle ear effusion.  Left ear-auricle is normally developed.  External auditory canal is clear.  Tympanic membrane is nonerythematous.  No middle ear effusion.  Nose:  Nasal dorsum is unremarkable.  No significant septal deviation.  No significant intranasal congestion.  Secretions are clear.  Oral cavity and oropharynx:  Tongue and floor mouth are without lesions.  Mucosa is moist but with thick secretions.  No pharyngeal erythema or exudates.  No oropharyngeal masses.  No tonsillar hypertrophy. Mild trismus.  Neck:  6. Cuffless Shiley tracheostomy tube is in place without inner cannula.  No palpable masses.  No adenopathy.  Parotid and submandibular glands are normal to palpation.  Eyes:  Extraocular muscles intact.  No nystagmus.  No exophthalmos or enophthalmos.  Neurologic:  Alert and oriented.  Cranial nerves 2-12 are grossly normal.    Procedure note: Flexible laryngoscopy.  The nose was prepped with 1% phenyleprine nasal spray and tetracaine topically. The flexible fiberoptic laryngoscope was introduced into the right nostril and advanced.  The nasal cavity was patent without significant crusting.  The nasopharynx was clear without any masses or lesions and there was no obstruction of the Eustachian used bilaterally.  Base of tongue was within normal limits although there was some pooling of secretions that did not " obstructing exam.  Examination of the larynx showed expected postsurgical changes without concerning lesion or mass.  Hypopharynx and piriform sinuses clear of lesion or masses bilaterally with some nonobstructive secretions.  The flexible scope was then passed through his tracheostomy tube in his trachea was visualized down to the olivier without any erythema or irritation in the were no masses or lesions appreciated    Assessment/Plan:   72-year-old male with history of laryngeal cancer s/p right hemilaryngectomy in 1999, recurrence in larynx in 2015 s/p CRT completed 2016. 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 Esophageal stenosis present and patient is PEG dependent. Last PET 1/2020 along with CT neck and thorax.  No evidence of disease today.  Patient cares for his tracheostomy tube without issues.  Patient performed his own PEG tubes without issue.    Plan:   Lipo-flavonoid for dizziness  Follow up in 1 year for routine cancer surveillance      JEFF Julio M.D.

## 2024-09-25 NOTE — PROGRESS NOTES
I have reviewed and agree with the resident's findings, including all diagnostic interpretations and plans as written.     Tim Lees M.D.

## 2024-10-03 ENCOUNTER — OFFICE VISIT (OUTPATIENT)
Dept: ORTHOPEDICS | Facility: CLINIC | Age: 74
End: 2024-10-03
Payer: OTHER GOVERNMENT

## 2024-10-03 VITALS
HEIGHT: 68 IN | HEART RATE: 72 BPM | DIASTOLIC BLOOD PRESSURE: 71 MMHG | SYSTOLIC BLOOD PRESSURE: 111 MMHG | WEIGHT: 118 LBS | BODY MASS INDEX: 17.88 KG/M2 | TEMPERATURE: 97 F

## 2024-10-03 DIAGNOSIS — M17.11 PRIMARY OSTEOARTHRITIS OF RIGHT KNEE: Primary | ICD-10-CM

## 2024-10-03 NOTE — PROGRESS NOTES
Chief Complaint:   Chief Complaint   Patient presents with    Follow-up     F/u bilat knee pain, here to discuss possible surgery. Has been an issue in the past d/t tracheostomy. Hoping to schedule surgery today.        History of present illness:    History of Present Illness  The patient presents for evaluation of right knee pain.    He is experiencing severe pain in his right knee, which he reports as being more intense than the discomfort in his left knee. He has expressed a desire to undergo surgery on his right knee, preferably on 12/02/2024 or 12/03/2024. His assistant will be available to provide care post-surgery. He is also curious about the duration of the surgical procedure.    Past Medical History:   Diagnosis Date    Arthritis     Hypertension     Laryngeal cancer 1999       Past Surgical History:   Procedure Laterality Date    COLONOSCOPY      GASTROSTOMY TUBE PLACEMENT      TRACHEAL SURGERY         Current Outpatient Medications   Medication Sig    ibuprofen (ADVIL,MOTRIN) 200 MG tablet Take 200 mg by mouth every 6 (six) hours as needed for Pain.    inositol-choline ars-uqnzh-O-C (LIPO-FLAVONOID) 500 mg Tab Take 1 tablet by mouth once daily.    levothyroxine (SYNTHROID) 50 MCG tablet Take 1 tablet by mouth every morning.    multivitamin with folic acid 400 mcg Tab Take 1 tablet by mouth every morning.    multivitamin with iron Tab TAKE 1 CAP/TAB BY MOUTH EVERY DAY FOR VITAMIN SUPPLEMENT (Patient not taking: Reported on 10/3/2024)     Current Facility-Administered Medications   Medication    LIDOcaine (PF) 40 mg/mL (4 %) injection 1 mL    phenylephrine HCL 1% nasal spray 1 spray       Review of patient's allergies indicates:  No Known Allergies    Family History   Problem Relation Name Age of Onset    Hypertension Mother      No Known Problems Father         Social History     Socioeconomic History    Marital status: Unknown   Tobacco Use    Smoking status: Former     Current packs/day: 0.00      Types: Cigarettes     Quit date: 1998     Years since quittin.0    Smokeless tobacco: Never   Substance and Sexual Activity    Alcohol use: No    Drug use: No    Sexual activity: Not Currently           Review of Systems:    Constitution: Negative for chills, fever, and sweats.  Negative for unexplained weight loss.    HENT:  Negative for headaches and blurry vision.    Cardiovascular:Negative for chest pain or irregular heart beat. Negative for hypertension.    Respiratory:  Negative for cough and shortness of breath.    Gastrointestinal: Negative for abdominal pain, heartburn, melena, nausea, and vomitting.    Genitourinary:  Negative bladder incontinence and dysuria.    Musculoskeletal:  See HPI    Neurological: Negative for numbness.    Psychiatric/Behavioral: Negative for depression.  The patient is not nervous/anxious.      Endocrine: Negative for polyuria    Hematologic/Lymphatic: Negative for bleeding problem.  Does not bruise/bleed easily.    Skin: Negative for poor would healing and rash      Physical Examination:    Vital Signs:    Vitals:    10/03/24 1118   BP: 111/71   Pulse: 72   Temp: 97.3 °F (36.3 °C)       Body mass index is 17.94 kg/m².    General: No acute distress, alert and oriented, healthy appearing    HEENT: Head is atraumatic, mucous membranes are moist    Neck: Supples, no JVD    Cardiovascular: Palpable dorsalis pedis and posterior tibial pulses, regular rate and rhythm to those pulses    Lungs: Breathing non-labored    Skin: no rashes appreciated    Neurologic: Can flex and extend knees, ankles, and toes. Sensation is grossly intact    Right knee:  Patient was significant limitations range of motion.  Patient has a about 20 degree flexion contracture.  Can flex to about 100°.  Significant crepitus throughout range of motion.    X-rays:  Three views right knee reviewed with the patient was end-stage osteoarthritis.  Complete loss of joint space and bone-on-bone articulation.   Kellgren Steven grade 4 changes.     Assessment::  Right knee osteoarthritis    Plan:  At this point the patient is tried and failed all conservative management with regards to their knee. They have tried and failed nonoperative management including: Anti-inflammatories, activity modification, injections. All of these have failed to completely remove their pain. They have pain going up and down stairs as well as walking on level ground. The knee pain is affecting activities of daily living They feel that theyve reached a point of disability with regards to their knee. X-rays reveal advanced, end-stage degenerative osteoarthritis as noted by subchondral sclerosis, joint space narrowing in the medial, lateral, patellofemoral compartment, and periarticular osteophytes. The patient would like to proceed with surgical intervention and would be a good candidate for total knee replacement.    Total knee arthroplasty procedure, alternatives, risks, and benefits were discussed in detail. The risks including but not limited to: infection, need for revision surgery, pain, swelling, loosening, injury to surrounding neurovascular structures, stiffness, incomplete resolution of pain, DVT, PE, and death were discussed in detail. Despite these risks, the patient would like to proceed with surgical intervention. All questions were answered, no guarantees made. Will plan for right TKA on November/December.    Discussed all treatment with the patient.  At this point, the patient has been cleared from an anesthesia perspective performed over here at the [facility].  We will plan to watch him overnight given his history of a tracheostomy.  Plan to perform surgery has been observation status.    This note was generated with the assistance of ambient listening technology. Verbal consent was obtained by the patient and accompanying visitor(s) for the recording of patient appointment to facilitate this note. I attest to having  reviewed and edited the generated note for accuracy, though some syntax or spelling errors may persist. Please contact the author of this note for any clarification.      This note was created using [employer] voice recognition software that occasionally misinterpreted phrases or words.    Consult note is delivered via Epic messaging service.

## 2024-10-31 ENCOUNTER — TELEPHONE (OUTPATIENT)
Dept: ORTHOPEDICS | Facility: CLINIC | Age: 74
End: 2024-10-31
Payer: OTHER GOVERNMENT

## 2024-10-31 NOTE — TELEPHONE ENCOUNTER
Patient wants to reschedule for after altaf. I will call him next week with a new pre-op and surgery date.

## 2024-11-13 NOTE — TELEPHONE ENCOUNTER
I called to give patient surgery dates and he realize he has an appointment on 1/20/2025 and doesn't want to reschedule. He ask can he get a later surgery date around 1/24/2025 please. I let him know I will call him back with a new date.

## 2024-12-30 ENCOUNTER — TELEPHONE (OUTPATIENT)
Dept: ORTHOPEDICS | Facility: CLINIC | Age: 74
End: 2024-12-30
Payer: OTHER GOVERNMENT

## 2024-12-30 NOTE — TELEPHONE ENCOUNTER
Attempted to contact cardiology specialist of [facility] due to not receiving pts CRA for upcoming sx yet.    No answer, lvm. Resent clearance request, see routing history.

## 2025-01-06 ENCOUNTER — ANESTHESIA EVENT (OUTPATIENT)
Dept: SURGERY | Facility: HOSPITAL | Age: 75
DRG: 470 | End: 2025-01-06
Payer: OTHER GOVERNMENT

## 2025-01-07 ENCOUNTER — OFFICE VISIT (OUTPATIENT)
Dept: ORTHOPEDICS | Facility: CLINIC | Age: 75
End: 2025-01-07
Payer: OTHER GOVERNMENT

## 2025-01-07 ENCOUNTER — HOSPITAL ENCOUNTER (OUTPATIENT)
Dept: RADIOLOGY | Facility: HOSPITAL | Age: 75
Discharge: HOME | End: 2025-01-07
Attending: NURSE PRACTITIONER
Payer: OTHER GOVERNMENT

## 2025-01-07 VITALS
DIASTOLIC BLOOD PRESSURE: 73 MMHG | HEART RATE: 60 BPM | HEIGHT: 68 IN | SYSTOLIC BLOOD PRESSURE: 121 MMHG | BODY MASS INDEX: 17.88 KG/M2 | WEIGHT: 117.94 LBS

## 2025-01-07 DIAGNOSIS — R79.9 ABNORMAL BLOOD CHEMISTRY LEVEL: ICD-10-CM

## 2025-01-07 DIAGNOSIS — Z01.818 PREOPERATIVE TESTING: ICD-10-CM

## 2025-01-07 DIAGNOSIS — M17.11 PRIMARY OSTEOARTHRITIS OF RIGHT KNEE: Primary | ICD-10-CM

## 2025-01-07 DIAGNOSIS — M17.11 PRIMARY OSTEOARTHRITIS OF RIGHT KNEE: ICD-10-CM

## 2025-01-07 DIAGNOSIS — M19.90 OSTEOARTHRITIS: ICD-10-CM

## 2025-01-07 LAB — MRSA PCR SCRN (OHS): NOT DETECTED

## 2025-01-07 PROCEDURE — 99214 OFFICE O/P EST MOD 30 MIN: CPT | Mod: ,,, | Performed by: NURSE PRACTITIONER

## 2025-01-07 PROCEDURE — 71046 X-RAY EXAM CHEST 2 VIEWS: CPT | Mod: TC

## 2025-01-07 PROCEDURE — 73700 CT LOWER EXTREMITY W/O DYE: CPT | Mod: TC,RT

## 2025-01-07 RX ORDER — TAMSULOSIN HYDROCHLORIDE 0.4 MG/1
0.4 CAPSULE ORAL
OUTPATIENT
Start: 2025-01-07

## 2025-01-07 RX ORDER — GABAPENTIN 100 MG/1
300 CAPSULE ORAL
OUTPATIENT
Start: 2025-01-07

## 2025-01-07 RX ORDER — SODIUM CHLORIDE, SODIUM GLUCONATE, SODIUM ACETATE, POTASSIUM CHLORIDE AND MAGNESIUM CHLORIDE 30; 37; 368; 526; 502 MG/100ML; MG/100ML; MG/100ML; MG/100ML; MG/100ML
INJECTION, SOLUTION INTRAVENOUS CONTINUOUS
OUTPATIENT
Start: 2025-01-07

## 2025-01-07 RX ORDER — KETOROLAC TROMETHAMINE 10 MG/1
10 TABLET, FILM COATED ORAL
OUTPATIENT
Start: 2025-01-07 | End: 2025-01-07

## 2025-01-07 RX ORDER — ONDANSETRON 4 MG/1
4 TABLET, ORALLY DISINTEGRATING ORAL
OUTPATIENT
Start: 2025-01-07

## 2025-01-07 RX ORDER — SCOLOPAMINE TRANSDERMAL SYSTEM 1 MG/1
1 PATCH, EXTENDED RELEASE TRANSDERMAL ONCE AS NEEDED
OUTPATIENT
Start: 2025-01-07 | End: 2036-06-05

## 2025-01-07 RX ORDER — TRANEXAMIC ACID 650 MG/1
1950 TABLET ORAL
OUTPATIENT
Start: 2025-01-07 | End: 2025-01-07

## 2025-01-07 RX ORDER — ACETAMINOPHEN 500 MG
1000 TABLET ORAL
OUTPATIENT
Start: 2025-01-07

## 2025-01-07 NOTE — PROGRESS NOTES
Chief Complaint:   Chief Complaint   Patient presents with    Right Knee - Pre-op Exam     Patient is here for Pre-Op Rt TKA Sx 1/22/25.        History of present illness: Toro Bains is a 74 y.o. male, presents to clinic today in regards to his right knee.  Patient does have a well known history of osteoarthritis to bilateral knees.  His pain has been ongoing for quite some time now.  Has worse than with the last couple of months.  Right significantly less than the left knee.  He has tried multiple modalities including over-the-counter anti-inflammatories, multiple injections and lifestyle modifications in which have failed to relieve his symptoms.  This has pain affect his daily living activities.  He feels as though he has reached a point of disability we would like to proceed with a right total knee arthroplasty.    Past Medical History:   Diagnosis Date    Arthritis     Hypertension     Laryngeal cancer 1999       Past Surgical History:   Procedure Laterality Date    COLONOSCOPY      GASTROSTOMY TUBE PLACEMENT      TRACHEAL SURGERY         Current Outpatient Medications   Medication Sig    ibuprofen (ADVIL,MOTRIN) 200 MG tablet Take 200 mg by mouth every 6 (six) hours as needed for Pain.    inositol-choline qic-kshzj-H-C (LIPO-FLAVONOID) 500 mg Tab Take 1 tablet by mouth once daily.    levothyroxine (SYNTHROID) 50 MCG tablet Take 1 tablet by mouth every morning.    multivitamin with folic acid 400 mcg Tab Take 1 tablet by mouth every morning.    multivitamin with iron Tab      Current Facility-Administered Medications   Medication    LIDOcaine (PF) 40 mg/mL (4 %) injection 1 mL    phenylephrine HCL 1% nasal spray 1 spray       Review of patient's allergies indicates:  No Known Allergies    Family History   Problem Relation Name Age of Onset    Hypertension Mother      No Known Problems Father         Social History     Socioeconomic History    Marital status: Unknown   Tobacco Use    Smoking status:  Former     Current packs/day: 0.00     Types: Cigarettes     Quit date: 1998     Years since quittin.2    Smokeless tobacco: Never   Substance and Sexual Activity    Alcohol use: No    Drug use: No    Sexual activity: Not Currently           Review of Systems:    Denies fevers, chills, chest pain, shortness of breath. Comprehensive review of systems performed and otherwise negative except as noted in HPI     Physical Examination:    General: awake and alert, no acute distress, healthy appearing  Head and Neck: Head atraumatic/normocephalic. Moist MM  CV: brisk cap refill  Lungs: non-labored breathing, w/o cough or SOB  Skin: no rashes present, warm to touch  Neuro: sensation grossly intact distally       Vital Signs:  There were no vitals filed for this visit.    There is no height or weight on file to calculate BMI.    Right knee:  Patient was significant limitations range of motion.  Patient has a about 20 degree flexion contracture.  Can flex to about 100°.  Significant crepitus throughout range of motion.     X-rays:  Three views right knee reviewed with the patient was end-stage osteoarthritis.  Complete loss of joint space and bone-on-bone articulation.  Kellgren Steven grade 4 changes.     Assessment::  Right knee osteoarthritis     Plan:  At this point the patient is tried and failed all conservative management with regards to their knee. They have tried and failed nonoperative management including: Anti-inflammatories, activity modification, injections. All of these have failed to completely remove their pain. They have pain going up and down stairs as well as walking on level ground. The knee pain is affecting activities of daily living They feel that theyve reached a point of disability with regards to their knee. X-rays reveal advanced, end-stage degenerative osteoarthritis as noted by subchondral sclerosis, joint space narrowing in the medial, lateral, patellofemoral compartment, and  periarticular osteophytes. The patient would like to proceed with surgical intervention and would be a good candidate for total knee replacement.     Total knee arthroplasty procedure, alternatives, risks, and benefits were discussed in detail. The risks including but not limited to: infection, need for revision surgery, pain, swelling, loosening, injury to surrounding neurovascular structures, stiffness, incomplete resolution of pain, DVT, PE, and death were discussed in detail. Despite these risks, the patient would like to proceed with surgical intervention. All questions were answered, no guarantees made. Will plan for right TKA on 1/22/25.     Discussed all treatment with the patient.  At this point, the patient has been cleared from an anesthesia perspective performed over here at the [facility].  We will plan to watch him overnight given his history of a tracheostomy.  Plan to perform surgery has been observation status.    This note was created using [employer] voice recognition software that occasionally misinterpreted phrases or words.    Consult note is delivered via Epic messaging service.

## 2025-01-07 NOTE — H&P (VIEW-ONLY)
Chief Complaint:   Chief Complaint   Patient presents with    Right Knee - Pre-op Exam     Patient is here for Pre-Op Rt TKA Sx 1/22/25.        History of present illness: Toro Bains is a 74 y.o. male, presents to clinic today in regards to his right knee.  Patient does have a well known history of osteoarthritis to bilateral knees.  His pain has been ongoing for quite some time now.  Has worse than with the last couple of months.  Right significantly less than the left knee.  He has tried multiple modalities including over-the-counter anti-inflammatories, multiple injections and lifestyle modifications in which have failed to relieve his symptoms.  This has pain affect his daily living activities.  He feels as though he has reached a point of disability we would like to proceed with a right total knee arthroplasty.    Past Medical History:   Diagnosis Date    Arthritis     Hypertension     Laryngeal cancer 1999       Past Surgical History:   Procedure Laterality Date    COLONOSCOPY      GASTROSTOMY TUBE PLACEMENT      TRACHEAL SURGERY         Current Outpatient Medications   Medication Sig    ibuprofen (ADVIL,MOTRIN) 200 MG tablet Take 200 mg by mouth every 6 (six) hours as needed for Pain.    inositol-choline rfl-wktva-L-C (LIPO-FLAVONOID) 500 mg Tab Take 1 tablet by mouth once daily.    levothyroxine (SYNTHROID) 50 MCG tablet Take 1 tablet by mouth every morning.    multivitamin with folic acid 400 mcg Tab Take 1 tablet by mouth every morning.    multivitamin with iron Tab      Current Facility-Administered Medications   Medication    LIDOcaine (PF) 40 mg/mL (4 %) injection 1 mL    phenylephrine HCL 1% nasal spray 1 spray       Review of patient's allergies indicates:  No Known Allergies    Family History   Problem Relation Name Age of Onset    Hypertension Mother      No Known Problems Father         Social History     Socioeconomic History    Marital status: Unknown   Tobacco Use    Smoking status:  Former     Current packs/day: 0.00     Types: Cigarettes     Quit date: 1998     Years since quittin.2    Smokeless tobacco: Never   Substance and Sexual Activity    Alcohol use: No    Drug use: No    Sexual activity: Not Currently           Review of Systems:    Denies fevers, chills, chest pain, shortness of breath. Comprehensive review of systems performed and otherwise negative except as noted in HPI     Physical Examination:    General: awake and alert, no acute distress, healthy appearing  Head and Neck: Head atraumatic/normocephalic. Moist MM  CV: brisk cap refill  Lungs: non-labored breathing, w/o cough or SOB  Skin: no rashes present, warm to touch  Neuro: sensation grossly intact distally       Vital Signs:  There were no vitals filed for this visit.    There is no height or weight on file to calculate BMI.    Right knee:  Patient was significant limitations range of motion.  Patient has a about 20 degree flexion contracture.  Can flex to about 100°.  Significant crepitus throughout range of motion.     X-rays:  Three views right knee reviewed with the patient was end-stage osteoarthritis.  Complete loss of joint space and bone-on-bone articulation.  Kellgren Steven grade 4 changes.     Assessment::  Right knee osteoarthritis     Plan:  At this point the patient is tried and failed all conservative management with regards to their knee. They have tried and failed nonoperative management including: Anti-inflammatories, activity modification, injections. All of these have failed to completely remove their pain. They have pain going up and down stairs as well as walking on level ground. The knee pain is affecting activities of daily living They feel that theyve reached a point of disability with regards to their knee. X-rays reveal advanced, end-stage degenerative osteoarthritis as noted by subchondral sclerosis, joint space narrowing in the medial, lateral, patellofemoral compartment, and  periarticular osteophytes. The patient would like to proceed with surgical intervention and would be a good candidate for total knee replacement.     Total knee arthroplasty procedure, alternatives, risks, and benefits were discussed in detail. The risks including but not limited to: infection, need for revision surgery, pain, swelling, loosening, injury to surrounding neurovascular structures, stiffness, incomplete resolution of pain, DVT, PE, and death were discussed in detail. Despite these risks, the patient would like to proceed with surgical intervention. All questions were answered, no guarantees made. Will plan for right TKA on 1/22/25.     Discussed all treatment with the patient.  At this point, the patient has been cleared from an anesthesia perspective performed over here at the [facility].  We will plan to watch him overnight given his history of a tracheostomy.  Plan to perform surgery has been observation status.    This note was created using [employer] voice recognition software that occasionally misinterpreted phrases or words.    Consult note is delivered via Epic messaging service.

## 2025-01-10 NOTE — CLINICAL REVIEW
labs/EKG/CXR reviewed. cardiology notes utd. Stress/Echo noted. CRA provided. chart review complete. ccv

## 2025-01-22 ENCOUNTER — ANESTHESIA (OUTPATIENT)
Dept: SURGERY | Facility: HOSPITAL | Age: 75
DRG: 470 | End: 2025-01-22
Payer: OTHER GOVERNMENT

## 2025-01-24 ENCOUNTER — TELEPHONE (OUTPATIENT)
Dept: ORTHOPEDICS | Facility: CLINIC | Age: 75
End: 2025-01-24
Payer: OTHER GOVERNMENT
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
## 2025-01-25 ENCOUNTER — TELEPHONE (OUTPATIENT)
Dept: ORTHOPEDICS | Facility: CLINIC | Age: 75
End: 2025-01-25
Payer: OTHER GOVERNMENT

## 2025-01-25 NOTE — TELEPHONE ENCOUNTER
Luisa with [facility] called to discuss inpatient rehab when patient is discharged from the hospital. Luisa is requesting that we start this process now by sending in a BECCA now for them to start the auth process. I explained to Luisa that we have to have [facility] auth and the facility has to agree to accept the patient for inpatient rehab. Explained that patient will be admitted for a max of 48 hours after surgery.     Luisa suggest that a BECCA is sent in ahead of time stating consult inpatient rehab at discharge from hospital.     Ref #: [id number]  Fax: [phone]  Email: [email or web address]    I called and spoke to Tiarra ( at the hospital) to see if she can begin this process. Tiarra is unable to start this process until the patient has surgery and admitted.

## 2025-01-31 ENCOUNTER — TELEPHONE (OUTPATIENT)
Dept: ORTHOPEDICS | Facility: CLINIC | Age: 75
End: 2025-01-31
Payer: OTHER GOVERNMENT

## 2025-01-31 NOTE — TELEPHONE ENCOUNTER
I completed a BECCA for inpatient rehab following discharge from hospital after surgery.     I faxed the BECCA to Emily @ [phone] and emailed it to her at [email or web address]

## 2025-02-03 ENCOUNTER — HOSPITAL ENCOUNTER (INPATIENT)
Facility: HOSPITAL | Age: 75
LOS: 1 days | Discharge: HOME HEALTH | DRG: 470 | End: 2025-02-06
Attending: ORTHOPAEDIC SURGERY | Admitting: ORTHOPAEDIC SURGERY
Payer: OTHER GOVERNMENT

## 2025-02-03 DIAGNOSIS — Z01.818 PREOPERATIVE TESTING: ICD-10-CM

## 2025-02-03 DIAGNOSIS — M17.11 PRIMARY OSTEOARTHRITIS OF RIGHT KNEE: ICD-10-CM

## 2025-02-03 DIAGNOSIS — R79.9 ABNORMAL BLOOD CHEMISTRY LEVEL: ICD-10-CM

## 2025-02-03 DIAGNOSIS — M19.90 OSTEOARTHRITIS: ICD-10-CM

## 2025-02-03 LAB
HCT VFR BLD AUTO: 37 % (ref 42–52)
HGB BLD-MCNC: 11.5 G/DL (ref 14–18)
POCT GLUCOSE: 83 MG/DL (ref 70–110)
POCT GLUCOSE: 90 MG/DL (ref 70–110)

## 2025-02-03 PROCEDURE — 82962 GLUCOSE BLOOD TEST: CPT | Performed by: ORTHOPAEDIC SURGERY

## 2025-02-03 PROCEDURE — 0SRC0JZ REPLACEMENT OF RIGHT KNEE JOINT WITH SYNTHETIC SUBSTITUTE, OPEN APPROACH: ICD-10-PCS | Performed by: ORTHOPAEDIC SURGERY

## 2025-02-03 PROCEDURE — 25000003 PHARM REV CODE 250: Performed by: NURSE PRACTITIONER

## 2025-02-03 PROCEDURE — 85014 HEMATOCRIT: CPT | Performed by: ORTHOPAEDIC SURGERY

## 2025-02-03 PROCEDURE — C1713 ANCHOR/SCREW BN/BN,TIS/BN: HCPCS | Performed by: ORTHOPAEDIC SURGERY

## 2025-02-03 PROCEDURE — 63600175 PHARM REV CODE 636 W HCPCS: Mod: JZ,TB | Performed by: NURSE ANESTHETIST, CERTIFIED REGISTERED

## 2025-02-03 PROCEDURE — 88311 DECALCIFY TISSUE: CPT

## 2025-02-03 PROCEDURE — 88305 TISSUE EXAM BY PATHOLOGIST: CPT | Performed by: ORTHOPAEDIC SURGERY

## 2025-02-03 PROCEDURE — 25000003 PHARM REV CODE 250: Performed by: ORTHOPAEDIC SURGERY

## 2025-02-03 PROCEDURE — 37000008 HC ANESTHESIA 1ST 15 MINUTES: Performed by: ORTHOPAEDIC SURGERY

## 2025-02-03 PROCEDURE — 63600175 PHARM REV CODE 636 W HCPCS: Performed by: NURSE PRACTITIONER

## 2025-02-03 PROCEDURE — 63600175 PHARM REV CODE 636 W HCPCS: Performed by: ORTHOPAEDIC SURGERY

## 2025-02-03 PROCEDURE — 64447 NJX AA&/STRD FEMORAL NRV IMG: CPT | Performed by: ANESTHESIOLOGY

## 2025-02-03 PROCEDURE — C1776 JOINT DEVICE (IMPLANTABLE): HCPCS | Performed by: ORTHOPAEDIC SURGERY

## 2025-02-03 PROCEDURE — 25000003 PHARM REV CODE 250: Performed by: NURSE ANESTHETIST, CERTIFIED REGISTERED

## 2025-02-03 PROCEDURE — 36000713 HC OR TIME LEV V EA ADD 15 MIN: Performed by: ORTHOPAEDIC SURGERY

## 2025-02-03 PROCEDURE — 8E0Y0CZ ROBOTIC ASSISTED PROCEDURE OF LOWER EXTREMITY, OPEN APPROACH: ICD-10-PCS | Performed by: ORTHOPAEDIC SURGERY

## 2025-02-03 PROCEDURE — 36000712 HC OR TIME LEV V 1ST 15 MIN: Performed by: ORTHOPAEDIC SURGERY

## 2025-02-03 PROCEDURE — G0378 HOSPITAL OBSERVATION PER HR: HCPCS

## 2025-02-03 PROCEDURE — 63600175 PHARM REV CODE 636 W HCPCS: Performed by: ANESTHESIOLOGY

## 2025-02-03 PROCEDURE — 27447 TOTAL KNEE ARTHROPLASTY: CPT | Mod: RT,,, | Performed by: ORTHOPAEDIC SURGERY

## 2025-02-03 PROCEDURE — A4216 STERILE WATER/SALINE, 10 ML: HCPCS | Performed by: ORTHOPAEDIC SURGERY

## 2025-02-03 PROCEDURE — D9220A PRA ANESTHESIA: Mod: ANES,,, | Performed by: ANESTHESIOLOGY

## 2025-02-03 PROCEDURE — 97162 PT EVAL MOD COMPLEX 30 MIN: CPT

## 2025-02-03 PROCEDURE — 27447 TOTAL KNEE ARTHROPLASTY: CPT | Mod: AS,RT,, | Performed by: NURSE PRACTITIONER

## 2025-02-03 PROCEDURE — 36415 COLL VENOUS BLD VENIPUNCTURE: CPT | Performed by: ORTHOPAEDIC SURGERY

## 2025-02-03 PROCEDURE — 0055T BONE SRGRY CMPTR CT/MRI IMAG: CPT | Mod: ,,, | Performed by: ORTHOPAEDIC SURGERY

## 2025-02-03 PROCEDURE — D9220A PRA ANESTHESIA: Mod: CRNA,,, | Performed by: NURSE ANESTHETIST, CERTIFIED REGISTERED

## 2025-02-03 PROCEDURE — 27201423 OPTIME MED/SURG SUP & DEVICES STERILE SUPPLY: Performed by: ORTHOPAEDIC SURGERY

## 2025-02-03 PROCEDURE — 37000009 HC ANESTHESIA EA ADD 15 MINS: Performed by: ORTHOPAEDIC SURGERY

## 2025-02-03 PROCEDURE — 99900035 HC TECH TIME PER 15 MIN (STAT)

## 2025-02-03 PROCEDURE — 71000033 HC RECOVERY, INTIAL HOUR: Performed by: ORTHOPAEDIC SURGERY

## 2025-02-03 PROCEDURE — 99900031 HC PATIENT EDUCATION (STAT)

## 2025-02-03 DEVICE — CRUCIATE RETAINING FEMORAL
Type: IMPLANTABLE DEVICE | Site: KNEE | Status: FUNCTIONAL
Brand: TRIATHLON

## 2025-02-03 DEVICE — TIBIAL COMPONENT
Type: IMPLANTABLE DEVICE | Site: KNEE | Status: FUNCTIONAL
Brand: TRIATHLON

## 2025-02-03 DEVICE — TIBIAL BEARING INSERT - CS
Type: IMPLANTABLE DEVICE | Site: KNEE | Status: FUNCTIONAL
Brand: TRIATHLON

## 2025-02-03 RX ORDER — KETOROLAC TROMETHAMINE 30 MG/ML
15 INJECTION, SOLUTION INTRAMUSCULAR; INTRAVENOUS EVERY 6 HOURS
Status: DISCONTINUED | OUTPATIENT
Start: 2025-02-03 | End: 2025-02-04

## 2025-02-03 RX ORDER — LEVOTHYROXINE SODIUM 50 UG/1
50 TABLET ORAL EVERY MORNING
Status: DISCONTINUED | OUTPATIENT
Start: 2025-02-04 | End: 2025-02-06 | Stop reason: HOSPADM

## 2025-02-03 RX ORDER — MIDAZOLAM HYDROCHLORIDE 1 MG/ML
INJECTION INTRAMUSCULAR; INTRAVENOUS
Status: DISCONTINUED | OUTPATIENT
Start: 2025-02-03 | End: 2025-02-03

## 2025-02-03 RX ORDER — ROPIVACAINE HYDROCHLORIDE 5 MG/ML
INJECTION, SOLUTION EPIDURAL; INFILTRATION; PERINEURAL
Status: DISPENSED
Start: 2025-02-03 | End: 2025-02-03

## 2025-02-03 RX ORDER — AMOXICILLIN 250 MG
2 CAPSULE ORAL 2 TIMES DAILY
Status: DISCONTINUED | OUTPATIENT
Start: 2025-02-03 | End: 2025-02-05

## 2025-02-03 RX ORDER — BUPIVACAINE HYDROCHLORIDE 2.5 MG/ML
INJECTION, SOLUTION EPIDURAL; INFILTRATION; INTRACAUDAL
Status: DISCONTINUED | OUTPATIENT
Start: 2025-02-03 | End: 2025-02-03

## 2025-02-03 RX ORDER — ACETAMINOPHEN 500 MG
1000 TABLET ORAL
Status: DISCONTINUED | OUTPATIENT
Start: 2025-02-03 | End: 2025-02-03

## 2025-02-03 RX ORDER — LIDOCAINE HYDROCHLORIDE 10 MG/ML
INJECTION, SOLUTION EPIDURAL; INFILTRATION; INTRACAUDAL; PERINEURAL
Status: DISCONTINUED | OUTPATIENT
Start: 2025-02-03 | End: 2025-02-03

## 2025-02-03 RX ORDER — MORPHINE SULFATE 10 MG/ML
INJECTION INTRAMUSCULAR; INTRAVENOUS; SUBCUTANEOUS
Status: DISPENSED
Start: 2025-02-03 | End: 2025-02-03

## 2025-02-03 RX ORDER — METHOCARBAMOL 750 MG/1
750 TABLET, FILM COATED ORAL EVERY 8 HOURS PRN
Status: DISCONTINUED | OUTPATIENT
Start: 2025-02-03 | End: 2025-02-06 | Stop reason: HOSPADM

## 2025-02-03 RX ORDER — HYDROCODONE BITARTRATE AND ACETAMINOPHEN 7.5; 325 MG/15ML; MG/15ML
10 SOLUTION ORAL EVERY 4 HOURS PRN
Status: DISCONTINUED | OUTPATIENT
Start: 2025-02-03 | End: 2025-02-06 | Stop reason: HOSPADM

## 2025-02-03 RX ORDER — GABAPENTIN 300 MG/1
300 CAPSULE ORAL NIGHTLY
Status: DISCONTINUED | OUTPATIENT
Start: 2025-02-03 | End: 2025-02-06 | Stop reason: HOSPADM

## 2025-02-03 RX ORDER — MORPHINE SULFATE 10 MG/ML
INJECTION INTRAMUSCULAR; INTRAVENOUS; SUBCUTANEOUS
Status: DISCONTINUED | OUTPATIENT
Start: 2025-02-03 | End: 2025-02-03 | Stop reason: HOSPADM

## 2025-02-03 RX ORDER — ACETAMINOPHEN 650 MG/20.3ML
500 LIQUID ORAL
Status: DISCONTINUED | OUTPATIENT
Start: 2025-02-04 | End: 2025-02-06 | Stop reason: HOSPADM

## 2025-02-03 RX ORDER — HYDROCODONE BITARTRATE AND ACETAMINOPHEN 7.5; 325 MG/15ML; MG/15ML
15 SOLUTION ORAL EVERY 4 HOURS PRN
Status: DISCONTINUED | OUTPATIENT
Start: 2025-02-03 | End: 2025-02-06 | Stop reason: HOSPADM

## 2025-02-03 RX ORDER — ROPIVACAINE HYDROCHLORIDE 5 MG/ML
INJECTION, SOLUTION EPIDURAL; INFILTRATION; PERINEURAL
Status: DISCONTINUED | OUTPATIENT
Start: 2025-02-03 | End: 2025-02-03 | Stop reason: HOSPADM

## 2025-02-03 RX ORDER — POLYETHYLENE GLYCOL 3350 17 G/17G
17 POWDER, FOR SOLUTION ORAL NIGHTLY
Status: DISCONTINUED | OUTPATIENT
Start: 2025-02-03 | End: 2025-02-05

## 2025-02-03 RX ORDER — KETOROLAC TROMETHAMINE 10 MG/1
10 TABLET, FILM COATED ORAL EVERY 6 HOURS
Status: DISCONTINUED | OUTPATIENT
Start: 2025-02-03 | End: 2025-02-03

## 2025-02-03 RX ORDER — GLYCOPYRROLATE 0.2 MG/ML
INJECTION INTRAMUSCULAR; INTRAVENOUS
Status: DISCONTINUED | OUTPATIENT
Start: 2025-02-03 | End: 2025-02-03

## 2025-02-03 RX ORDER — EPINEPHRINE 1 MG/ML
INJECTION, SOLUTION, CONCENTRATE INTRAVENOUS
Status: DISCONTINUED | OUTPATIENT
Start: 2025-02-03 | End: 2025-02-03 | Stop reason: HOSPADM

## 2025-02-03 RX ORDER — KETOROLAC TROMETHAMINE 30 MG/ML
INJECTION, SOLUTION INTRAMUSCULAR; INTRAVENOUS
Status: DISPENSED
Start: 2025-02-03 | End: 2025-02-03

## 2025-02-03 RX ORDER — TRANEXAMIC ACID 100 MG/ML
1950 INJECTION, SOLUTION INTRAVENOUS
Status: COMPLETED | OUTPATIENT
Start: 2025-02-03 | End: 2025-02-03

## 2025-02-03 RX ORDER — BUPIVACAINE HYDROCHLORIDE 2.5 MG/ML
INJECTION, SOLUTION EPIDURAL; INFILTRATION; INTRACAUDAL
Status: COMPLETED
Start: 2025-02-03 | End: 2025-02-03

## 2025-02-03 RX ORDER — SODIUM CHLORIDE 0.9 % (FLUSH) 0.9 %
SYRINGE (ML) INJECTION
Status: DISPENSED
Start: 2025-02-03 | End: 2025-02-03

## 2025-02-03 RX ORDER — VANCOMYCIN HYDROCHLORIDE 1 G/20ML
INJECTION, POWDER, LYOPHILIZED, FOR SOLUTION INTRAVENOUS
Status: DISCONTINUED | OUTPATIENT
Start: 2025-02-03 | End: 2025-02-03 | Stop reason: HOSPADM

## 2025-02-03 RX ORDER — VANCOMYCIN HYDROCHLORIDE 1 G/20ML
INJECTION, POWDER, LYOPHILIZED, FOR SOLUTION INTRAVENOUS
Status: DISPENSED
Start: 2025-02-03 | End: 2025-02-03

## 2025-02-03 RX ORDER — TALC
6 POWDER (GRAM) TOPICAL NIGHTLY PRN
Status: DISCONTINUED | OUTPATIENT
Start: 2025-02-03 | End: 2025-02-06 | Stop reason: HOSPADM

## 2025-02-03 RX ORDER — METOCLOPRAMIDE HYDROCHLORIDE 5 MG/ML
10 INJECTION INTRAMUSCULAR; INTRAVENOUS
Status: COMPLETED | OUTPATIENT
Start: 2025-02-03 | End: 2025-02-04

## 2025-02-03 RX ORDER — FAMOTIDINE 20 MG/1
20 TABLET, FILM COATED ORAL 2 TIMES DAILY
Status: DISCONTINUED | OUTPATIENT
Start: 2025-02-03 | End: 2025-02-06

## 2025-02-03 RX ORDER — LIDOCAINE HYDROCHLORIDE 10 MG/ML
1 INJECTION, SOLUTION EPIDURAL; INFILTRATION; INTRACAUDAL; PERINEURAL ONCE
Status: DISCONTINUED | OUTPATIENT
Start: 2025-02-03 | End: 2025-02-03 | Stop reason: HOSPADM

## 2025-02-03 RX ORDER — SODIUM CHLORIDE 9 MG/ML
INJECTION, SOLUTION INTRAMUSCULAR; INTRAVENOUS; SUBCUTANEOUS
Status: DISCONTINUED | OUTPATIENT
Start: 2025-02-03 | End: 2025-02-03 | Stop reason: HOSPADM

## 2025-02-03 RX ORDER — NAPROXEN SODIUM 220 MG/1
81 TABLET, FILM COATED ORAL 2 TIMES DAILY
Status: DISCONTINUED | OUTPATIENT
Start: 2025-02-04 | End: 2025-02-06 | Stop reason: HOSPADM

## 2025-02-03 RX ORDER — KETOROLAC TROMETHAMINE 30 MG/ML
15 INJECTION, SOLUTION INTRAMUSCULAR; INTRAVENOUS ONCE
Status: COMPLETED | OUTPATIENT
Start: 2025-02-03 | End: 2025-02-03

## 2025-02-03 RX ORDER — BUPIVACAINE HYDROCHLORIDE 7.5 MG/ML
INJECTION, SOLUTION EPIDURAL; RETROBULBAR
Status: COMPLETED | OUTPATIENT
Start: 2025-02-03 | End: 2025-02-03

## 2025-02-03 RX ORDER — GABAPENTIN 300 MG/1
300 CAPSULE ORAL
Status: COMPLETED | OUTPATIENT
Start: 2025-02-03 | End: 2025-02-03

## 2025-02-03 RX ORDER — TRANEXAMIC ACID 650 MG/1
1950 TABLET ORAL
Status: DISCONTINUED | OUTPATIENT
Start: 2025-02-03 | End: 2025-02-03

## 2025-02-03 RX ORDER — ONDANSETRON HYDROCHLORIDE 2 MG/ML
4 INJECTION, SOLUTION INTRAVENOUS EVERY 6 HOURS PRN
Status: DISCONTINUED | OUTPATIENT
Start: 2025-02-03 | End: 2025-02-06 | Stop reason: HOSPADM

## 2025-02-03 RX ORDER — ACETAMINOPHEN 10 MG/ML
1000 INJECTION, SOLUTION INTRAVENOUS ONCE
Status: COMPLETED | OUTPATIENT
Start: 2025-02-03 | End: 2025-02-03

## 2025-02-03 RX ORDER — EPHEDRINE SULFATE 50 MG/ML
INJECTION, SOLUTION INTRAVENOUS
Status: DISCONTINUED | OUTPATIENT
Start: 2025-02-03 | End: 2025-02-03

## 2025-02-03 RX ORDER — SODIUM CHLORIDE, SODIUM GLUCONATE, SODIUM ACETATE, POTASSIUM CHLORIDE AND MAGNESIUM CHLORIDE 30; 37; 368; 526; 502 MG/100ML; MG/100ML; MG/100ML; MG/100ML; MG/100ML
INJECTION, SOLUTION INTRAVENOUS CONTINUOUS
Status: DISCONTINUED | OUTPATIENT
Start: 2025-02-03 | End: 2025-02-03

## 2025-02-03 RX ORDER — SODIUM CHLORIDE 9 MG/ML
INJECTION, SOLUTION INTRAVENOUS CONTINUOUS
Status: ACTIVE | OUTPATIENT
Start: 2025-02-03 | End: 2025-02-04

## 2025-02-03 RX ORDER — EPINEPHRINE 1 MG/ML
INJECTION, SOLUTION, CONCENTRATE INTRAVENOUS
Status: DISPENSED
Start: 2025-02-03 | End: 2025-02-03

## 2025-02-03 RX ORDER — KETOROLAC TROMETHAMINE 10 MG/1
10 TABLET, FILM COATED ORAL
Status: DISCONTINUED | OUTPATIENT
Start: 2025-02-03 | End: 2025-02-03

## 2025-02-03 RX ORDER — GLUCAGON 1 MG
1 KIT INJECTION
Status: DISCONTINUED | OUTPATIENT
Start: 2025-02-03 | End: 2025-02-03 | Stop reason: HOSPADM

## 2025-02-03 RX ORDER — BISACODYL 10 MG/1
10 SUPPOSITORY RECTAL DAILY
Status: DISCONTINUED | OUTPATIENT
Start: 2025-02-06 | End: 2025-02-06 | Stop reason: HOSPADM

## 2025-02-03 RX ORDER — ONDANSETRON 4 MG/1
4 TABLET, ORALLY DISINTEGRATING ORAL
Status: COMPLETED | OUTPATIENT
Start: 2025-02-03 | End: 2025-02-03

## 2025-02-03 RX ORDER — CEFAZOLIN 2 G/1
2 INJECTION, POWDER, FOR SOLUTION INTRAMUSCULAR; INTRAVENOUS
Status: DISCONTINUED | OUTPATIENT
Start: 2025-02-03 | End: 2025-02-03

## 2025-02-03 RX ORDER — TRAMADOL HYDROCHLORIDE 50 MG/1
50 TABLET ORAL EVERY 4 HOURS PRN
Status: DISCONTINUED | OUTPATIENT
Start: 2025-02-03 | End: 2025-02-03

## 2025-02-03 RX ORDER — HYDROMORPHONE HYDROCHLORIDE 2 MG/ML
0.4 INJECTION, SOLUTION INTRAMUSCULAR; INTRAVENOUS; SUBCUTANEOUS EVERY 5 MIN PRN
Status: DISCONTINUED | OUTPATIENT
Start: 2025-02-03 | End: 2025-02-03 | Stop reason: HOSPADM

## 2025-02-03 RX ORDER — ALUMINUM HYDROXIDE, MAGNESIUM HYDROXIDE, AND SIMETHICONE 1200; 120; 1200 MG/30ML; MG/30ML; MG/30ML
30 SUSPENSION ORAL EVERY 6 HOURS PRN
Status: DISCONTINUED | OUTPATIENT
Start: 2025-02-03 | End: 2025-02-06 | Stop reason: HOSPADM

## 2025-02-03 RX ORDER — PROPOFOL 10 MG/ML
VIAL (ML) INTRAVENOUS
Status: DISCONTINUED | OUTPATIENT
Start: 2025-02-03 | End: 2025-02-03

## 2025-02-03 RX ORDER — ADHESIVE BANDAGE
30 BANDAGE TOPICAL DAILY PRN
Status: DISCONTINUED | OUTPATIENT
Start: 2025-02-03 | End: 2025-02-06 | Stop reason: HOSPADM

## 2025-02-03 RX ORDER — HYDROCODONE BITARTRATE AND ACETAMINOPHEN 5; 325 MG/1; MG/1
1 TABLET ORAL EVERY 4 HOURS PRN
Status: DISCONTINUED | OUTPATIENT
Start: 2025-02-03 | End: 2025-02-03

## 2025-02-03 RX ORDER — MORPHINE SULFATE 4 MG/ML
4 INJECTION, SOLUTION INTRAMUSCULAR; INTRAVENOUS
Status: ACTIVE | OUTPATIENT
Start: 2025-02-03 | End: 2025-02-04

## 2025-02-03 RX ORDER — CEFAZOLIN 2 G/1
2 INJECTION, POWDER, FOR SOLUTION INTRAMUSCULAR; INTRAVENOUS
Status: COMPLETED | OUTPATIENT
Start: 2025-02-03 | End: 2025-02-03

## 2025-02-03 RX ORDER — SCOPOLAMINE 1 MG/3D
1 PATCH, EXTENDED RELEASE TRANSDERMAL ONCE AS NEEDED
Status: DISCONTINUED | OUTPATIENT
Start: 2025-02-03 | End: 2025-02-03 | Stop reason: HOSPADM

## 2025-02-03 RX ORDER — TAMSULOSIN HYDROCHLORIDE 0.4 MG/1
0.4 CAPSULE ORAL
Status: COMPLETED | OUTPATIENT
Start: 2025-02-03 | End: 2025-02-03

## 2025-02-03 RX ORDER — PROCHLORPERAZINE EDISYLATE 5 MG/ML
5 INJECTION INTRAMUSCULAR; INTRAVENOUS EVERY 30 MIN PRN
Status: DISCONTINUED | OUTPATIENT
Start: 2025-02-03 | End: 2025-02-03 | Stop reason: HOSPADM

## 2025-02-03 RX ORDER — DOCUSATE SODIUM 100 MG/1
200 CAPSULE, LIQUID FILLED ORAL
Status: DISCONTINUED | OUTPATIENT
Start: 2025-02-04 | End: 2025-02-06 | Stop reason: HOSPADM

## 2025-02-03 RX ORDER — KETOROLAC TROMETHAMINE 30 MG/ML
INJECTION, SOLUTION INTRAMUSCULAR; INTRAVENOUS
Status: DISCONTINUED | OUTPATIENT
Start: 2025-02-03 | End: 2025-02-03 | Stop reason: HOSPADM

## 2025-02-03 RX ADMIN — EPHEDRINE SULFATE 5 MG: 50 INJECTION INTRAVENOUS at 11:02

## 2025-02-03 RX ADMIN — TRANEXAMIC ACID 1950 MG: 100 INJECTION, SOLUTION INTRAVENOUS at 10:02

## 2025-02-03 RX ADMIN — GLYCOPYRROLATE 0.2 MG: 0.2 INJECTION INTRAMUSCULAR; INTRAVENOUS at 10:02

## 2025-02-03 RX ADMIN — POLYETHYLENE GLYCOL 3350 17 G: 17 POWDER, FOR SOLUTION ORAL at 10:02

## 2025-02-03 RX ADMIN — FAMOTIDINE 20 MG: 20 TABLET, FILM COATED ORAL at 10:02

## 2025-02-03 RX ADMIN — PROPOFOL 50 MG: 10 INJECTION, EMULSION INTRAVENOUS at 10:02

## 2025-02-03 RX ADMIN — ACETAMINOPHEN 1000 MG: 10 INJECTION INTRAVENOUS at 10:02

## 2025-02-03 RX ADMIN — GABAPENTIN 300 MG: 300 CAPSULE ORAL at 10:02

## 2025-02-03 RX ADMIN — SODIUM CHLORIDE, SODIUM GLUCONATE, SODIUM ACETATE, POTASSIUM CHLORIDE AND MAGNESIUM CHLORIDE: 526; 502; 368; 37; 30 INJECTION, SOLUTION INTRAVENOUS at 10:02

## 2025-02-03 RX ADMIN — EPHEDRINE SULFATE 10 MG: 50 INJECTION INTRAVENOUS at 11:02

## 2025-02-03 RX ADMIN — EPHEDRINE SULFATE 10 MG: 50 INJECTION INTRAVENOUS at 10:02

## 2025-02-03 RX ADMIN — METOCLOPRAMIDE HYDROCHLORIDE 10 MG: 5 INJECTION INTRAMUSCULAR; INTRAVENOUS at 11:02

## 2025-02-03 RX ADMIN — BUPIVACAINE HYDROCHLORIDE 30 ML: 2.5 INJECTION, SOLUTION EPIDURAL; INFILTRATION; INTRACAUDAL; PERINEURAL at 12:02

## 2025-02-03 RX ADMIN — ACETAMINOPHEN 1000 MG: 10 INJECTION INTRAVENOUS at 08:02

## 2025-02-03 RX ADMIN — KETOROLAC TROMETHAMINE 15 MG: 30 INJECTION, SOLUTION INTRAMUSCULAR at 09:02

## 2025-02-03 RX ADMIN — KETOROLAC TROMETHAMINE 15 MG: 30 INJECTION, SOLUTION INTRAMUSCULAR at 06:02

## 2025-02-03 RX ADMIN — CEFAZOLIN 2 G: 2 INJECTION, POWDER, FOR SOLUTION INTRAMUSCULAR; INTRAVENOUS at 06:02

## 2025-02-03 RX ADMIN — LIDOCAINE HYDROCHLORIDE 50 MG: 10 INJECTION, SOLUTION EPIDURAL; INFILTRATION; INTRACAUDAL; PERINEURAL at 10:02

## 2025-02-03 RX ADMIN — TAMSULOSIN HYDROCHLORIDE 0.4 MG: 0.4 CAPSULE ORAL at 09:02

## 2025-02-03 RX ADMIN — PROPOFOL 50 MCG/KG/MIN: 10 INJECTION, EMULSION INTRAVENOUS at 10:02

## 2025-02-03 RX ADMIN — GABAPENTIN 300 MG: 300 CAPSULE ORAL at 09:02

## 2025-02-03 RX ADMIN — METOCLOPRAMIDE HYDROCHLORIDE 10 MG: 5 INJECTION INTRAMUSCULAR; INTRAVENOUS at 04:02

## 2025-02-03 RX ADMIN — SODIUM CHLORIDE: 9 INJECTION, SOLUTION INTRAVENOUS at 12:02

## 2025-02-03 RX ADMIN — EPHEDRINE SULFATE 5 MG: 50 INJECTION INTRAVENOUS at 10:02

## 2025-02-03 RX ADMIN — ONDANSETRON 4 MG: 4 TABLET, ORALLY DISINTEGRATING ORAL at 09:02

## 2025-02-03 RX ADMIN — CEFAZOLIN 2 G: 2 INJECTION, POWDER, FOR SOLUTION INTRAMUSCULAR; INTRAVENOUS at 10:02

## 2025-02-03 RX ADMIN — BUPIVACAINE HYDROCHLORIDE 1.7 ML: 7.5 INJECTION, SOLUTION EPIDURAL; RETROBULBAR at 10:02

## 2025-02-03 RX ADMIN — SENNOSIDES AND DOCUSATE SODIUM 2 TABLET: 50; 8.6 TABLET ORAL at 10:02

## 2025-02-03 RX ADMIN — MIDAZOLAM 2 MG: 1 INJECTION INTRAMUSCULAR; INTRAVENOUS at 10:02

## 2025-02-03 NOTE — ANESTHESIA PROCEDURE NOTES
Spinal    Diagnosis: Osteoarthritis, Right Knee  Patient location during procedure: OR  Start time: 2/3/2025 10:25 AM  Timeout: 2/3/2025 10:23 AM  End time: 2/3/2025 10:33 AM    Staffing  Authorizing Provider: Husam Cuevas MD  Performing Provider: Husam Cuevas MD    Staffing  Performed by: Husam Cuevas MD  Authorized by: Husam Cuevas MD    Preanesthetic Checklist  Completed: patient identified, IV checked, site marked, risks and benefits discussed, surgical consent, monitors and equipment checked, pre-op evaluation and timeout performed  Spinal Block  Patient position: sitting  Prep: ChloraPrep  Patient monitoring: heart rate, cardiac monitor, continuous pulse ox and frequent blood pressure checks  Approach: midline  Location: L3-4  Injection technique: single shot  CSF Fluid: clear free-flowing CSF  Needle  Needle type: pencil-tip   Needle gauge: 25 G  Needle length: 3.5 in  Additional Documentation: incremental injection, negative aspiration for heme and no paresthesia on injection  Needle localization: anatomical landmarks  Assessment  Sensory level: T6   Dermatomal levels determined by pinch or prick  Ease of block: difficult (very tight calcified IS)  Patient's tolerance of the procedure: comfortable throughout block and no complaints  Medications:    Medications: bupivacaine (pf) (MARCAINE) injection 0.75% - Intraspinal   1.7 mL - 2/3/2025 10:33:00 AM

## 2025-02-03 NOTE — ANESTHESIA POSTPROCEDURE EVALUATION
Anesthesia Post Evaluation    Patient: Toro Bains    Procedure(s) Performed: Procedure(s) (LRB):  ROBOTIC ARTHROPLASTY, KNEE, TOTAL (Right)    Final Anesthesia Type: spinal      Patient location during evaluation: PACU  Patient participation: Yes- Able to Participate  Level of consciousness: oriented and awake  Post-procedure vital signs: reviewed and stable  Pain management: adequate  Airway patency: patent    PONV status at discharge: No PONV  Anesthetic complications: no      Cardiovascular status: stable and hemodynamically stable  Respiratory status: spontaneous ventilation and unassisted  Hydration status: euvolemic  Follow-up not needed.  Comments: Located within [facility]          NEURO: Neuraxial block resolving. Adductor Canal Block placed in PACU    Vitals Value Taken Time   /65 02/03/25 1305   Temp 36.1 °C (97 °F) 02/03/25 1200   Pulse 59 02/03/25 1305   Resp 23 02/03/25 1231   SpO2 100 % 02/03/25 1305   Vitals shown include unfiled device data.      Event Time   Out of Recovery 12:30:00         Pain/Avery Score: Pain Rating Prior to Med Admin: 7 (2/3/2025  9:15 AM)  Avery Score: 9 (2/3/2025 12:31 PM)

## 2025-02-03 NOTE — PROGRESS NOTES
Patient experiencing a delayed response to anesthesia. Will convert to observation status, Will initiate our pre-emptive multimodal pain mgt protocol, provide post-anesthesia monitoring and perform frequent pain assessments. Will plan for discharge once the patient is tolerating pain and pain medications appropriately and the patient has been cleared from a safety/mobility standpoint.         Co-morbidities mgt:   Hypertension - Home meds restarted, BP stable at at baseline    Hypothyroidism - home meds restarted

## 2025-02-03 NOTE — ANESTHESIA PROCEDURE NOTES
Peripheral Block    Patient location during procedure: pre-op   Block not for primary anesthetic.  Reason for block: at surgeon's request and post-op pain management   Post-op Pain Location: Knee, Right   Start time: 2/3/2025 12:15 PM  Timeout: 2/3/2025 12:13 PM   End time: 2/3/2025 12:17 PM    Staffing  Authorizing Provider: Husam Cuevas MD  Performing Provider: Husam Cuevas MD    Staffing  Performed by: Husam Cuevas MD  Authorized by: Husam Cuevas MD    Preanesthetic Checklist  Completed: patient identified, IV checked, site marked, risks and benefits discussed, surgical consent, monitors and equipment checked, pre-op evaluation and timeout performed  Peripheral Block  Patient position: supine  Prep: ChloraPrep  Patient monitoring: heart rate, cardiac monitor, continuous pulse ox and frequent blood pressure checks  Block type: adductor canal  Laterality: right  Injection technique: single shot  Needle  Needle type: Stimuplex   Needle gauge: 20 G  Needle length: 4 in  Needle localization: anatomical landmarks and ultrasound guidance   -ultrasound image captured on disc.  Assessment  Injection assessment: negative aspiration, negative parasthesia and local visualized surrounding nerve  Paresthesia pain: none  Heart rate change: no  Slow fractionated injection: yes  Pain Tolerance: comfortable throughout block and no complaints  Medications:    Medications: bupivacaine (pf) (MARCAINE) injection 0.25% - Perineural   30 mL - 2/3/2025 12:16:00 PM    Additional Notes  VSS.  [facility] RN monitoring vitals throughout procedure. U/S Image revealed normal appearing adductor canal anatomy. Continuously aspirated between incremental injections (HEME - neg). No parathesia's reported. Patient tolerated procedure well.

## 2025-02-03 NOTE — PLAN OF CARE
Problem: Adult Inpatient Plan of Care  Goal: Plan of Care Review  Outcome: Progressing  Goal: Patient-Specific Goal (Individualized)  Outcome: Progressing  Goal: Absence of Hospital-Acquired Illness or Injury  Outcome: Progressing  Goal: Optimal Comfort and Wellbeing  Outcome: Progressing  Goal: Readiness for Transition of Care  Outcome: Progressing     Problem: Wound  Goal: Optimal Coping  Outcome: Progressing  Goal: Optimal Functional Ability  Outcome: Progressing  Goal: Absence of Infection Signs and Symptoms  Outcome: Progressing  Goal: Improved Oral Intake  Outcome: Progressing  Goal: Optimal Pain Control and Function  Outcome: Progressing  Goal: Skin Health and Integrity  Outcome: Progressing  Goal: Optimal Wound Healing  Outcome: Progressing     Problem: Skin Injury Risk Increased  Goal: Skin Health and Integrity  Outcome: Progressing     Problem: Knee Arthroplasty  Goal: Optimal Coping  Outcome: Progressing  Goal: Absence of Bleeding  Outcome: Progressing  Goal: Effective Bowel Elimination  Outcome: Progressing  Goal: Fluid and Electrolyte Balance  Outcome: Progressing  Goal: Optimal Functional Ability  Outcome: Progressing  Goal: Absence of Infection Signs and Symptoms  Outcome: Progressing  Goal: Intact Neurovascular Status  Outcome: Progressing  Goal: Anesthesia/Sedation Recovery  Outcome: Progressing  Goal: Optimal Pain Control and Function  Outcome: Progressing  Goal: Nausea and Vomiting Relief  Outcome: Progressing  Goal: Effective Urinary Elimination  Outcome: Progressing  Goal: Effective Oxygenation and Ventilation  Outcome: Progressing

## 2025-02-03 NOTE — PT/OT/SLP EVAL
Physical Therapy Evaluation    Patient Name:  Toro Bains   MRN:  [id number]    Recommendations:     Discharge Recommendations: Low Intensity Therapy   Discharge Equipment Recommendations: walker, rolling   Barriers to discharge: None    Assessment:     Toro Bains is a 74 y.o. male admitted with a medical diagnosis of <principal problem not specified>.  He presents with the following impairments/functional limitations: weakness, impaired endurance, impaired functional mobility, decreased lower extremity function, pain, decreased ROM, edema, orthopedic precautions .    Rehab Prognosis: Good; patient would benefit from acute skilled PT services to address these deficits and reach maximum level of function.    Recent Surgery: Procedure(s) (LRB):  ROBOTIC ARTHROPLASTY, KNEE, TOTAL (Right) Day of Surgery    Plan:     During this hospitalization, patient to be seen BID to address the identified rehab impairments via therapeutic activities, gait training, therapeutic exercises and progress toward the following goals:    Plan of Care Expires:  02/07/25    Subjective     Chief Complaint: R knee pain  Patient/Family Comments/goals:   Pain/Comfort:  Location - Side 1: Right  Location 1: knee  Pain Addressed 1: Pre-medicate for activity, Reposition, Distraction    Patients cultural, spiritual, [employer] conflicts given the current situation:      Living Environment:  Pt lives in single story home alone typically with only small threshold to enter  Prior to admission, patients level of function was ind.  Equipment used at home: none.  DME owned (not currently used): rolling walker.  Upon discharge, patient will have assistance from family.    Objective:     Communicated with nurse prior to session.  Patient found supine with peripheral IV, telemetry  upon PT entry to room.    General Precautions: Standard, fall  Orthopedic Precautions:RLE weight bearing as tolerated   Braces: N/A  Respiratory Status: Room  air    Exams:  RLE ROM:     (In degrees) AROM PROM   R knee flexion 90 100   R knee extension 0       RLE Strength: NT dt sx side  LLE ROM: WFL  LLE Strength: WFL    Functional Mobility:  Bed Mobility:     Supine to Sit: stand by assistance  Transfers:     Sit to Stand:  contact guard assistance with rolling walker  Gait: Pt ambulated 200 ft w rw and CGA, using step through gait pattern at slow pace        Treatment & Education:  Pt edu on total knee protocol and importance of frequent mobility  Pt completed prehab prior to sx    Patient left up in chair with all lines intact, call button in reach, nurse notified, and family present.    GOALS:   Multidisciplinary Problems       Physical Therapy Goals          Problem: Physical Therapy    Goal Priority Disciplines Outcome Interventions   Physical Therapy Goal     PT, PT/OT Progressing    Description: Pt will improve functional independence by performing:    Bed mobility: SBA  Sit to stand: SBA with rolling walker  Bed to chair: SBA with Stand Step  with rolling walker   Car Transfer: SBA with rolling walker  Ambulation x 200'  feet with SBA and rolling walker  1 Step (Curb): Min A  and rolling walker  3 Steps: Min A  and B HR  right knee AROM flexion (in degrees): 90  right knee AROM extension (in degrees): 0   Independent with total knee HEP                          DME Justifications:   Toro's mobility limitation cannot be sufficiently resolved by the use of a cane. His functional mobility deficit can be sufficiently resolved with the use of a Rolling Walker. Patient's mobility limitation significantly impairs their ability to participate in one of more activities of daily living.  The use of a RW will significantly improve the patient's ability to participate in MRADLS and the patient will use it on regular basis in the home.    History:     Past Medical History:   Diagnosis Date    Arthritis     Hypertension     Laryngeal cancer 1999    Thyroid disease         Past Surgical History:   Procedure Laterality Date    COLONOSCOPY      GASTROSTOMY TUBE PLACEMENT  12/2018    TRACHEAL SURGERY  09/28/2016       Time Tracking:     PT Received On:    PT Start Time: 1652     PT Stop Time: 1707  PT Total Time (min): 15 min     Billable Minutes: Evaluation 15      02/03/2025

## 2025-02-03 NOTE — ANESTHESIA PREPROCEDURE EVALUATION
02/02/2025  Toro Bains is a 74 y.o., male, who presents for the following:    Procedure: ROBOTIC ARTHROPLASTY, KNEE, TOTAL (Right: Knee)   Anesthesia type: Choice   Diagnosis: Primary osteoarthritis of right knee [M17.11]   Pre-op diagnosis: Primary osteoarthritis of right knee [M17.11]   Location: [facility] OR 08 / [facility] OR   Surgeons: Jfefrey Francis MD     Past Medical History:   Diagnosis Date    Arthritis     Hypertension     Laryngeal cancer 1999    Thyroid disease      LAB:          EKG:  Sinus bradycardia   Septal infarct ,age undetermined   Abnormal ECG   No previous ECGs available   Confirmed by Gumaro Jade ([id number]) on 1/7/2025 12:35:46 PM       Pre-op Assessment    I have reviewed the Patient Summary Reports.     I have reviewed the Nursing Notes. I have reviewed the NPO Status.   I have reviewed the Medications.     Review of Systems  Anesthesia Hx:  No problems with previous Anesthesia             Denies Family Hx of Anesthesia complications.    Denies Personal Hx of Anesthesia complications.                    Social:  Former Smoker       Hematology/Oncology:                                  Oncology Comments: Laryngeal CA, s/p Laryngectomy     Cardiovascular:     Hypertension                                          Pulmonary:  Pulmonary Normal                       Hepatic/GI:        PEG             Endocrine:   Hypothyroidism              Physical Exam  General: Alert and Oriented    Airway:  Mallampati: III   Mouth Opening: < 3 cm  TM Distance: Normal  Tongue: Normal  Neck ROM: Normal ROM  Pre-Existing Airway: Tracheal Stoma    Chest/Lungs:  Normal Respiratory Rate    Heart:  Rate: Normal  Rhythm: Regular Rhythm        Anesthesia Plan  Type of Anesthesia, risks & benefits discussed:    Anesthesia Type: Gen Natural Airway, Spinal  Intra-op Monitoring Plan: Standard ASA Monitors  Post Op  Pain Control Plan: IV/PO Opioids PRN and peripheral nerve block  Induction:  IV  Airway Plan: Direct  Informed Consent: Informed consent signed with the Patient and all parties understand the risks and agree with anesthesia plan.  All questions answered. Patient consented to blood products? No  ASA Score: 3  Day of Surgery Review of History & Physical: H&P Update referred to the surgeon/provider.  Anesthesia Plan Notes: Tracheal supplemental oxygenation   Spinal Anesthesia w/ IV propofol sedation  vs. GETA  per tracheal stoma  For patients with QUYEN/obesity, may consider SuperNoval Nasal CPAP  Adductor Canal Block for post-operative analgesia, per surgeon request      Ready For Surgery From Anesthesia Perspective.     .

## 2025-02-03 NOTE — OP NOTE
OPERATIVE REPORT    Patient: Toro Bains   : 1950    MRN: [id number]  Date: 2025      Surgeon: Jeffrey Francis MD  Assistant: Kate Engel NP  Assistant was essential, part of the procedure including positioning, holding multiple retractors, deep hardware placement and deep closure.    Preoperative Diagnosis:  R knee primary osteoarthritis  Postoperative Diagnosis: Same  Procedure:  R RACHEL TKA (93598)  Anesthesiologist: Husam Cuevas MD  OR Staff: Circulator: Yordy Contreras RN  Nurse Practitioner: Kate Engel FNP  Scrub Person: Nini Saavedra RN; Albert Chen ST  Implants:   Implant Name Type Inv. Item Serial No.  Lot No. LRB No. Used Action   PIN BONE [id number] - [id number]  PIN BONE [id number]  [employer].  Right 1 Implanted and Explanted   PIN FIXATION BONE 140X3.2MM - [id number]  PIN FIXATION BONE 140X3.2MM  [employer].  Right 1 Implanted and Explanted   COMPONENT FEM TRI CR SZ5 R - [id number]  COMPONENT FEM TRI CR SZ5 R  [employer]. [id number] Right 1 Implanted   INSERT TIBIAL CS X3 10MM SZ 5 - [id number]  INSERT TIBIAL CS X3 10MM SZ 5  [employer]. [id number] Right 1 Implanted   BASEPLATE TIBIAL TRIATHLON SZ - [id number]  BASEPLATE TIBIAL TRIATHLON SZ  [employer]. [id number] Right 1 Implanted     EBL: 50  Complications: None  Disposition: To PACU, stable    Indications: Toro Bains is a 74 y.o. male presenting with R knee pain.  Patient had tried and failed all conservative measures including injections, activity modification, NSAIDs, and home exercise program.  Risks, benefits, and alternatives discussed with regards to right total knee arthroplasty.  All questions answered to patients satisfaction, no guarantees made.  Despite these risks, the patient agreed to proceed with surgical intervention.     Procedure Note:  The patient was brought back to the OR and placed supine  on the OR table. After successful induction of anesthesia by anesthesia staff, all bony prominences were padded appropriately.  Tourniquet placed to the right upper thigh.  Right knee prepped and draped in normal sterile fashion. At this time, a time-out was performed, with the correct patient, site, and procedure identified.  Preoperative antibiotics were verified as administered.     Leg was exsanguinated using Esmarch tourniquet and tourniquet was inflated to 250 mmHg.  Standard midline parapatellar incision was performed taken through the skin and subcutaneous tissue.  A fresh knife was used to make an arthrotomy midline parapatellar approach.  Proximal medial tibia retinaculam was released from the proximal medial tibial plateau.  Small resection of prepatellar fat pad was performed.  Knee flexed up.  Schanz pins attached to the distal femur as well as distal tibia.  [employer] robotic arrays attached to both sets of pins.  Checkpoints attached to distal femur and proximal tibia.  Registration performed initially with hip center followed by ankle center.  And registration of both the distal femur and proximal tibia performed using [place] protocol.  We then checked joint balance and ligamentous tension in both extension as well as 90° of flexion.  Adjusted femoral and tibial implants to get appropriate gaps in both extension as well as flexion.  After we were satisfied with implant position as well as volume resection, [employer] robot was brought in.  Femoral cuts performed followed by tibial cuts.  Care performed to protect all necessary soft tissue structures during bony resection.  Bony fragments and resected and removed from the knee.  Medial and lateral meniscus resected.  Tibial tray placed in the position and pinned.  Trial liner placed in position.  Trial femoral component placed in position and adjusted medially and laterally to the appropriate position.  Knee taken to full range of motion.  Came to full extension.   Excellent flexion greater than 120°.  Stable throughout his range of motion with no extension instability or flexion instability.  Patella tracked well.    Femoral lugs drilled and the femoral trial removed.  We then punched the tibia with a tibial tower.  Tibial trial then removed.  Implants opened.  Tibia component implanted, followed by the polyethylene liner.  Femoral component then implanted.    At this point brought the knee to full range of motion yet again.  Again excellent range of motion from full extension greater than 120° of flexion was noted with no instability at full flexion, mid flexion and extension.  Checkpoints removed as well as pins.    The tourniquet was dropped.  All bleeders were coagulated.  Injection of joint cocktail periarticularly.  Copious irrigation with normal saline performed along with Betadine lavage followed by more normal saline.  We then turned our attention towards closure.  Arthrotomy closed with #1 Vicryl as well as #1 Stratafix suture.  Subcutaneous tissue closed with 2-0 Monocryl.  Followed by skin closure.    Patient arose from anesthesia without any issue and was then subsequently transferred to to PACU in a stable condition.    All sponge and needle counts were correct at the end of the case.  I was present and participated in all aspects of the procedure.    Prognosis:  The patient will be weight-bearing as tolerated to the right lower extremity with range of motion with physical therapy.  Patient will receive DVT prophylaxis .   plan discharge home versus a facility once the patient is stable with physical therapy guidelines.      This note/OR report was created with the assistance of  voice recognition software or phone  dictation.  There may be transcription errors as a result of using this technology however minimal. Effort has been made to assure accuracy of transcription but any obvious errors or omissions should be clarified with the author of the document.

## 2025-02-03 NOTE — TRANSFER OF CARE
"Anesthesia Transfer of Care Note    Patient: Toro Bains    Procedure(s) Performed: Procedure(s) (LRB):  ROBOTIC ARTHROPLASTY, KNEE, TOTAL (Right)    Patient location: PACU    Anesthesia Type: general and spinal    Transport from OR: Transported from OR on room air with adequate spontaneous ventilation    Post pain: adequate analgesia    Post assessment: no apparent anesthetic complications and tolerated procedure well    Post vital signs: stable    Level of consciousness: responds to stimulation    Nausea/Vomiting: no nausea/vomiting    Complications: none    Transfer of care protocol was followed      Last vitals: Visit Vitals  BP (!) 141/90 (BP Location: Left arm, Patient Position: Lying)   Pulse 71   Temp 36.1 °C (97 °F) (Tympanic)   Resp 20   Ht 5' 9" (1.753 m)   Wt 53.4 kg (117 lb 11.6 oz)   BMI 17.39 kg/m²     "

## 2025-02-04 PROBLEM — E43 SEVERE MALNUTRITION: Chronic | Status: ACTIVE | Noted: 2025-02-04

## 2025-02-04 LAB
ANION GAP SERPL CALC-SCNC: 7 MEQ/L
BUN SERPL-MCNC: 18.2 MG/DL (ref 8.4–25.7)
CALCIUM SERPL-MCNC: 8.4 MG/DL (ref 8.8–10)
CHLORIDE SERPL-SCNC: 106 MMOL/L (ref 98–107)
CO2 SERPL-SCNC: 26 MMOL/L (ref 23–31)
CREAT SERPL-MCNC: 0.86 MG/DL (ref 0.72–1.25)
CREAT/UREA NIT SERPL: 21
ERYTHROCYTE [DISTWIDTH] IN BLOOD BY AUTOMATED COUNT: 11.9 % (ref 11.5–17)
GFR SERPLBLD CREATININE-BSD FMLA CKD-EPI: >60 ML/MIN/1.73/M2
GLUCOSE SERPL-MCNC: 120 MG/DL (ref 82–115)
HCT VFR BLD AUTO: 33.3 % (ref 42–52)
HGB BLD-MCNC: 10.5 G/DL (ref 14–18)
MCH RBC QN AUTO: 29 PG (ref 27–31)
MCHC RBC AUTO-ENTMCNC: 31.5 G/DL (ref 33–36)
MCV RBC AUTO: 92 FL (ref 80–94)
NRBC BLD AUTO-RTO: 0 %
PLATELET # BLD AUTO: 143 X10(3)/MCL (ref 130–400)
PMV BLD AUTO: 12.4 FL (ref 7.4–10.4)
POTASSIUM SERPL-SCNC: 4.2 MMOL/L (ref 3.5–5.1)
RBC # BLD AUTO: 3.62 X10(6)/MCL (ref 4.7–6.1)
SODIUM SERPL-SCNC: 139 MMOL/L (ref 136–145)
WBC # BLD AUTO: 3.99 X10(3)/MCL (ref 4.5–11.5)

## 2025-02-04 PROCEDURE — 85027 COMPLETE CBC AUTOMATED: CPT | Performed by: ORTHOPAEDIC SURGERY

## 2025-02-04 PROCEDURE — 99900031 HC PATIENT EDUCATION (STAT)

## 2025-02-04 PROCEDURE — 25000003 PHARM REV CODE 250: Performed by: NURSE PRACTITIONER

## 2025-02-04 PROCEDURE — 97116 GAIT TRAINING THERAPY: CPT

## 2025-02-04 PROCEDURE — 25000003 PHARM REV CODE 250: Performed by: ORTHOPAEDIC SURGERY

## 2025-02-04 PROCEDURE — 80048 BASIC METABOLIC PNL TOTAL CA: CPT | Performed by: ORTHOPAEDIC SURGERY

## 2025-02-04 PROCEDURE — 63600175 PHARM REV CODE 636 W HCPCS: Mod: TB,JZ | Performed by: ORTHOPAEDIC SURGERY

## 2025-02-04 PROCEDURE — 36415 COLL VENOUS BLD VENIPUNCTURE: CPT | Performed by: ORTHOPAEDIC SURGERY

## 2025-02-04 PROCEDURE — 94761 N-INVAS EAR/PLS OXIMETRY MLT: CPT

## 2025-02-04 PROCEDURE — 99900035 HC TECH TIME PER 15 MIN (STAT)

## 2025-02-04 PROCEDURE — G0378 HOSPITAL OBSERVATION PER HR: HCPCS

## 2025-02-04 RX ORDER — NAPROXEN SODIUM 220 MG/1
81 TABLET, FILM COATED ORAL 2 TIMES DAILY
Qty: 84 TABLET | Refills: 0 | Status: SHIPPED | OUTPATIENT
Start: 2025-02-04 | End: 2025-03-18

## 2025-02-04 RX ORDER — POLYETHYLENE GLYCOL 3350 17 G/17G
17 POWDER, FOR SOLUTION ORAL DAILY
Qty: 14 EACH | Refills: 0 | Status: SHIPPED | OUTPATIENT
Start: 2025-02-04 | End: 2025-02-18

## 2025-02-04 RX ORDER — ACETAMINOPHEN 160 MG/5ML
500 LIQUID ORAL EVERY 4 HOURS PRN
Qty: 236 ML | Refills: 0 | Status: SHIPPED | OUTPATIENT
Start: 2025-02-04 | End: 2025-02-18

## 2025-02-04 RX ORDER — KETOROLAC TROMETHAMINE 10 MG/1
10 TABLET, FILM COATED ORAL EVERY 6 HOURS
Status: DISCONTINUED | OUTPATIENT
Start: 2025-02-05 | End: 2025-02-06 | Stop reason: HOSPADM

## 2025-02-04 RX ORDER — ACETAMINOPHEN 160 MG/5ML
500 LIQUID ORAL EVERY 4 HOURS PRN
Start: 2025-02-04 | End: 2025-02-04

## 2025-02-04 RX ADMIN — KETOROLAC TROMETHAMINE 10 MG: 10 TABLET, FILM COATED ORAL at 11:02

## 2025-02-04 RX ADMIN — ASPIRIN 81 MG 81 MG: 81 TABLET ORAL at 08:02

## 2025-02-04 RX ADMIN — ACETAMINOPHEN 499.51 MG: 650 SOLUTION ORAL at 11:02

## 2025-02-04 RX ADMIN — ACETAMINOPHEN 499.51 MG: 650 SOLUTION ORAL at 08:02

## 2025-02-04 RX ADMIN — LEVOTHYROXINE SODIUM 50 MCG: 0.05 TABLET ORAL at 06:02

## 2025-02-04 RX ADMIN — METOCLOPRAMIDE HYDROCHLORIDE 10 MG: 5 INJECTION INTRAMUSCULAR; INTRAVENOUS at 10:02

## 2025-02-04 RX ADMIN — CEFAZOLIN 2 G: 2 INJECTION, POWDER, FOR SOLUTION INTRAMUSCULAR; INTRAVENOUS at 06:02

## 2025-02-04 RX ADMIN — ACETAMINOPHEN 499.51 MG: 650 SOLUTION ORAL at 06:02

## 2025-02-04 RX ADMIN — SENNOSIDES AND DOCUSATE SODIUM 2 TABLET: 50; 8.6 TABLET ORAL at 08:02

## 2025-02-04 RX ADMIN — ACETAMINOPHEN 499.51 MG: 650 SOLUTION ORAL at 12:02

## 2025-02-04 RX ADMIN — DOCUSATE SODIUM 200 MG: 100 CAPSULE, LIQUID FILLED ORAL at 06:02

## 2025-02-04 RX ADMIN — METOCLOPRAMIDE HYDROCHLORIDE 10 MG: 5 INJECTION INTRAMUSCULAR; INTRAVENOUS at 03:02

## 2025-02-04 RX ADMIN — GABAPENTIN 300 MG: 300 CAPSULE ORAL at 08:02

## 2025-02-04 RX ADMIN — KETOROLAC TROMETHAMINE 15 MG: 30 INJECTION, SOLUTION INTRAMUSCULAR at 12:02

## 2025-02-04 RX ADMIN — ACETAMINOPHEN 499.51 MG: 650 SOLUTION ORAL at 03:02

## 2025-02-04 RX ADMIN — CEFAZOLIN 2 G: 2 INJECTION, POWDER, FOR SOLUTION INTRAMUSCULAR; INTRAVENOUS at 12:02

## 2025-02-04 RX ADMIN — KETOROLAC TROMETHAMINE 15 MG: 30 INJECTION, SOLUTION INTRAMUSCULAR at 06:02

## 2025-02-04 RX ADMIN — FAMOTIDINE 20 MG: 20 TABLET, FILM COATED ORAL at 08:02

## 2025-02-04 RX ADMIN — KETOROLAC TROMETHAMINE 15 MG: 30 INJECTION, SOLUTION INTRAMUSCULAR at 05:02

## 2025-02-04 RX ADMIN — METOCLOPRAMIDE HYDROCHLORIDE 10 MG: 5 INJECTION INTRAMUSCULAR; INTRAVENOUS at 09:02

## 2025-02-04 RX ADMIN — METOCLOPRAMIDE HYDROCHLORIDE 10 MG: 5 INJECTION INTRAMUSCULAR; INTRAVENOUS at 04:02

## 2025-02-04 RX ADMIN — POLYETHYLENE GLYCOL 3350 17 G: 17 POWDER, FOR SOLUTION ORAL at 08:02

## 2025-02-04 NOTE — PT/OT/SLP PROGRESS
"Physical Therapy Treatment    Patient Name:  Toro Bains   MRN:  [id number]    Recommendations:     Discharge Recommendations: Low Intensity Therapy  Discharge Equipment Recommendations: walker, rolling  Barriers to discharge: None    Assessment:     Toro Bains is a 74 y.o. male admitted with a medical diagnosis of Primary osteoarthritis of right knee.  He presents with the following impairments/functional limitations: weakness, impaired endurance, impaired functional mobility, decreased lower extremity function, pain, decreased ROM, edema, orthopedic precautions .    Rehab Prognosis: Good; patient would benefit from acute skilled PT services to address these deficits and reach maximum level of function.    Recent Surgery: Procedure(s) (LRB):  ROBOTIC ARTHROPLASTY, KNEE, TOTAL (Right) 1 Day Post-Op    Plan:     During this hospitalization, patient to be seen BID to address the identified rehab impairments via therapeutic activities, gait training, therapeutic exercises and progress toward the following goals:    Plan of Care Expires:  02/07/25    Subjective     Chief Complaint: R knee pain  Patient/Family Comments/goals:   Pain/Comfort:  Location - Side 1: Right  Location 1: knee  Pain Addressed 1: Pre-medicate for activity, Reposition      Objective:     Communicated with nurse prior to session.  Patient found supine with peripheral IV, telemetry upon PT entry to room.     General Precautions: Standard, fall  Orthopedic Precautions: RLE weight bearing as tolerated  Braces: N/A  Respiratory Status: Room air     Functional Mobility:  Bed Mobility:     Supine to Sit: stand by assistance  Transfers:     Sit to Stand:  stand by assistance with rolling walker  Car Transfer: stand by assistance with  rolling walker  using  Step Transfer  Gait: Pt ambulated 250 ft, 200 ft w rw and SBA, using step through gait pattern at normal pace.  Stairs:  Pt ascended/descended 3 stair(s) and 4" curb step with Rolling " Walker with bilateral handrails with Contact Guard Assistance.     (In degrees) AROM PROM   R knee flexion 105 110   R knee extension 0         Treatment & Education:  Pt edu on total knee protocol and importance of frequent mobility  Pt completed seated TKA therex x10 AAROM    Patient left up in chair with all lines intact, call button in reach, and nurse notified..    GOALS:   Multidisciplinary Problems       Physical Therapy Goals          Problem: Physical Therapy    Goal Priority Disciplines Outcome Interventions   Physical Therapy Goal     PT, PT/OT Progressing    Description: Pt will improve functional independence by performing:    Bed mobility: SBA   Sit to stand: SBA with rolling walker MET  Bed to chair: SBA with Stand Step  with rolling walker   Car Transfer: SBA with rolling walker MET  Ambulation x 200'  feet with SBA and rolling walker MET  1 Step (Curb): Min A  and rolling walker MET  3 Steps: Min A  and B HR MET  right knee AROM flexion (in degrees): 90 MET  right knee AROM extension (in degrees): 0 MET  Independent with total knee HEP MET                       DME Justifications:   Toro's mobility limitation cannot be sufficiently resolved by the use of a cane. His functional mobility deficit can be sufficiently resolved with the use of a Rolling Walker. Patient's mobility limitation significantly impairs their ability to participate in one of more activities of daily living.  The use of a RW will significantly improve the patient's ability to participate in MRADLS and the patient will use it on regular basis in the home.    Time Tracking:     PT Received On:    PT Start Time: 1021     PT Stop Time: 1039  PT Total Time (min): 18 min     Billable Minutes: Gait Training 18    Treatment Type: Treatment  PT/PTA: PT     Number of PTA visits since last PT visit: 0     02/04/2025

## 2025-02-04 NOTE — PROGRESS NOTES
"No acute events overnight.  Pain controlled.  Resting in bed.     Vital Signs  Temp: 98.2 °F (36.8 °C)  Temp Source: Oral  Pulse: 78  Heart Rate Source: Monitor  Resp: 18  SpO2: 99 %  Flow (L/min) (Oxygen Therapy): 6  Device (Oxygen Therapy): room air  BP: (!) 96/46  BP Location: Left arm  BP Method: Automatic  Patient Position: Lying  Height and Weight  Height: 5' 9" (175.3 cm)  Height Method: Stated  Weight: 53.4 kg (117 lb 11.6 oz)  Weight Method: Standard Scale  BSA (Calculated - sq m): 1.61 sq meters  BMI (Calculated): 17.4  Weight in (lb) to have BMI = 25: 168.9]    +FHL/EHL  BCR distally  Dressing c/d/i  SILT distally    Recent Lab Results         02/04/25  0449   02/03/25  1206   02/03/25  1204        Anion Gap 7.0           BUN 18.2           BUN/CREAT RATIO 21           Calcium 8.4           Chloride 106           CO2 26           Creatinine 0.86           eGFR >60  Comment: Estimated GFR calculated using the CKD-EPI creatinine (2021) equation.           Glucose 120           Hematocrit 33.3     37.0       Hemoglobin 10.5     11.5       MCH 29.0           MCHC 31.5           MCV 92.0           MPV 12.4           nRBC 0.0           Platelet Count 143           POCT Glucose   90         Potassium 4.2           RBC 3.62           RDW 11.9           Sodium 139           WBC 3.99                   A/P:  Status post TKA  Pain controlled  Overall patient doing well.  Therapy for mobility and ambulation.  ASA for DVT PPx  [facility]  "

## 2025-02-04 NOTE — PROGRESS NOTES
Inpatient Nutrition Assessment    Admit Date: 2/3/2025   Total duration of encounter: 1 day   Patient Age: 74 y.o.    Nutrition Recommendation/Prescription     Continue bolus tube feedings as tolerated.  237 mL Jevity 1.5 5x daily via PEG to provide:   -1778 kcal (100% est needs)   -76 g protein (100% est needs)   -900 mL fluid + FWF     Will continue to monitor wt, labs, and TF tolerance.     Communication of Recommendations: reviewed with nurse and reviewed with patient    Nutrition Assessment     Malnutrition Assessment/Nutrition-Focused Physical Exam    Malnutrition Context: chronic illness (02/04/25 1128)  Malnutrition Level: severe (02/04/25 1128)     Weight Loss (Malnutrition): other (see comments) (Unable to assess) (02/04/25 1128)  Subcutaneous Fat (Malnutrition): severe depletion (02/04/25 1128)  Orbital Region (Subcutaneous Fat Loss): severe depletion  Upper Arm Region (Subcutaneous Fat Loss): severe depletion     Muscle Mass (Malnutrition): severe depletion (02/04/25 1128)  Sabianist Region (Muscle Loss): severe depletion  Clavicle Bone Region (Muscle Loss): severe depletion  Clavicle and Acromion Bone Region (Muscle Loss): severe depletion     Dorsal Hand (Muscle Loss): severe depletion           Fluid Accumulation (Malnutrition): other (see comments) (Not present) (02/04/25 1128)     Hand  Strength, Right (Malnutrition): Unable to assess (02/04/25 1128)  A minimum of two characteristics is recommended for diagnosis of either severe or non-severe malnutrition.    Chart Review    Reason Seen: continuous nutrition monitoring    Malnutrition Screening Tool Results   Have you recently lost weight without trying?: No  Have you been eating poorly because of a decreased appetite?: No   MST Score: 0   Diagnosis:  Primary osteoarthritis of right knee     Relevant Medical History:    Arthritis      Hypertension      Laryngeal cancer 1999      Scheduled Medications:  acetaminophen, 499.5074 mg, 6 times per  "day  aspirin, 81 mg, BID  [START ON 2/6/2025] bisacodyL, 10 mg, Daily  docusate sodium, 200 mg, Before breakfast  famotidine, 20 mg, BID  gabapentin, 300 mg, QHS  ketorolac, 15 mg, Q6H  levothyroxine, 50 mcg, QAM  metoclopramide, 10 mg, Q6H  polyethylene glycol, 17 g, QHS  senna-docusate 8.6-50 mg, 2 tablet, BID    Continuous Infusions:   PRN Medications:  aluminum-magnesium hydroxide-simethicone, 30 mL, Q6H PRN  HYDROcodone-acetaminophen 7.5-325 mg/15 mL, 10 mL, Q4H PRN  HYDROcodone-acetaminophen 7.5-325 mg/15 mL, 15 mL, Q4H PRN  magnesium hydroxide 400 mg/5 ml, 30 mL, Daily PRN  melatonin, 6 mg, Nightly PRN  methocarbamoL, 750 mg, Q8H PRN  morphine, 4 mg, Q3H PRN  ondansetron, 4 mg, Q6H PRN    Calorie Containing IV Medications: no significant kcals from medications at this time    Recent Labs   Lab 02/03/25  1204 02/04/25  0449   NA  --  139   K  --  4.2   CALCIUM  --  8.4*   CL  --  106   CO2  --  26   BUN  --  18.2   CREATININE  --  0.86   EGFRNORACEVR  --  >60   GLUCOSE  --  120*   WBC  --  3.99*   HGB 11.5* 10.5*   HCT 37.0* 33.3*     Nutrition Orders:  No diet orders on file      Appetite/Oral Intake: NPO/NPO  Factors Affecting Nutritional Intake: NPO  Social Needs Impacting Access to Food: unable to assess at this time; will attempt on follow-up  Food/Sikh/Cultural Preferences: unable to obtain  Food Allergies: no known food allergies  Last Bowel Movement: 02/02/25  Wound(s):  incision right knee    Comments    2/4/25: Pt on 237 mL Jevity 1.5 5x daily at home + FWF. Pt has PEG 2/2 laryngeal cancer. Pt tolerating TF and able to give himself boluses w/o issue. Pt brought his own supply of Jevity for his hospital stay. No n/v/d/c. Last BM yesterday.     Anthropometrics    Height: 5' 9" (175.3 cm), Height Method: Stated  Last Weight: 53.4 kg (117 lb 11.6 oz) (02/03/25 0804), Weight Method: Standard Scale  BMI (Calculated): 17.4  BMI Classification: underweight (BMI less than 18.5)        Ideal Body Weight " (IBW), Male: 160 lb     % Ideal Body Weight, Male (lb): 73.58 %                          Usual Weight Provided By: EMR weight history    Wt Readings from Last 5 Encounters:   02/03/25 53.4 kg (117 lb 11.6 oz)   01/07/25 53.5 kg (117 lb 15.1 oz)   10/03/24 53.5 kg (118 lb)   09/24/24 53.9 kg (118 lb 13.3 oz)   09/19/24 53.9 kg (118 lb 12.8 oz)     Weight Change(s) Since Admission: new admit  Wt Readings from Last 1 Encounters:   02/03/25 0804 53.4 kg (117 lb 11.6 oz)   Admit Weight: 53.4 kg (117 lb 11.6 oz) (02/03/25 0804), Weight Method: Stated    Estimated Needs    Weight Used For Calorie Calculations: 53.4 kg (117 lb 11.6 oz)  Energy Calorie Requirements (kcal): 2534-9708 kcal (30-35 kcal/kg)  Energy Need Method: Kcal/kg  Weight Used For Protein Calculations: 53.4 kg (117 lb 11.6 oz)  Protein Requirements: 53-64 g (1.0-1.2 g/kg)  Fluid Requirements (mL): 2136 mL/d (40 mL/kg)        Enteral Nutrition     Formula:  Jevity 1.5  Rate/Volume: 237 mL 5x/day  Water Flushes: per pt/ RN  Additives/Modulars: none at this time  Route: PEG tube  Method: bolus  Total Nutrition Provided by Tube Feeding, Additives, and Flushes:  Calories Provided  1778 kcal/d, 100% needs   Protein Provided  76 g/d, 100% needs   Fluid Provided  900 + FWF ml/d, ?% needs   Continuous feeding calculations based on estimated 20 hr/d run time unless otherwise stated.    Parenteral Nutrition     Patient not receiving parenteral nutrition support at this time.    Evaluation of Received Nutrient Intake    Calories: meeting estimated needs  Protein: meeting estimated needs    Patient Education     Not applicable.    Nutrition Diagnosis     PES: Severe chronic disease or condition related malnutrition Related to chronic illness As Evidenced by:  - weight loss: Unable to assess - muscle mass depletion: 4 areas of severe muscle loss (Temporalis, Interosseous, Clavicle, Acromion) - loss of subcutaneous fat: 2 areas of severe fat loss (Infraorbital, Triceps  Skinfold) new    Nutrition Interventions     Intervention(s): modified composition of enteral nutrition, modified rate of enteral nutrition, and collaboration with other providers  Intervention(s): Enteral nutrition    Goal: Maintain weight throughout hospitalization. (new)    Nutrition Goals & Monitoring     Dietitian will monitor: enteral nutrition intake, weight, electrolyte/renal panel, glucose/endocrine profile, and gastrointestinal profile  Discharge planning: resume home regimen  Nutrition Risk/Follow-Up: low (follow-up in 5-7 days)   Please consult if re-assessment needed sooner.

## 2025-02-04 NOTE — PT/OT/SLP PROGRESS
"Physical Therapy Treatment    Patient Name:  Toro Bains   MRN:  [id number]    Recommendations:     Discharge therapy intensity: Low Intensity Therapy   Discharge Equipment Recommendations: walker, rolling  Barriers to discharge: None    Assessment:     Toro Bains is a 74 y.o. male admitted with a medical diagnosis of Primary osteoarthritis of right knee s/p total knee arthroplasty.  He presents with the following impairments/functional limitations: weakness, impaired endurance, impaired functional mobility, decreased lower extremity function, pain, decreased ROM, edema, orthopedic precautions.    Rehab Prognosis: Good; patient would benefit from acute skilled PT services to address these deficits and reach maximum level of function.    Recent Surgery: Procedure(s) (LRB):  ROBOTIC ARTHROPLASTY, KNEE, TOTAL (Right) 1 Day Post-Op    Plan:     During this hospitalization, patient would benefit from acute PT services BID to address the identified rehab impairments via therapeutic activities, gait training, therapeutic exercises and progress toward the following goals:    Plan of Care Expires:  02/07/25    Subjective     Chief Complaint: no complaints  Patient/Family Comments/goals: agreeable to session  Pain/Comfort:  Pain Rating 1: other (see comments) (unrated; "not much")  Location - Side 1: Right  Location - Orientation 1: generalized  Location 1: knee  Pain Addressed 1: Distraction, Pre-medicate for activity      Objective:     Communicated with RN prior to session.  Patient found up in chair with peripheral IV upon PT entry to room.     General Precautions: Standard, fall  Orthopedic Precautions: RLE weight bearing as tolerated  Braces: N/A  Respiratory Status: Room air      Functional Mobility:  Transfers:     Sit to Stand:  stand by assistance with rolling walker  Gait: The pt ambulated ~200 ft with RW and SBA. Step-through gait pattern, normal pace. No LOB noted.  Stairs:  Pt ascended/descended 4" " curb step and ramp with Rolling Walker with Stand-by Assistance.     Therapeutic Activities/Exercises:  The pt performed x 10 reps of total knee home exercise program on the RLE.     Education:  Patient provided with verbal education and handouts education regarding PT role/goals/POC, post-op precautions, fall prevention, safety awareness, discharge/DME recommendations, and home exercise program.  Understanding was verbalized.     Patient left up in chair with all lines intact, call button in reach, and friend present    GOALS:   Multidisciplinary Problems       Physical Therapy Goals          Problem: Physical Therapy    Goal Priority Disciplines Outcome Interventions   Physical Therapy Goal     PT, PT/OT Progressing    Description: Pt will improve functional independence by performing:    Bed mobility: SBA   Sit to stand: SBA with rolling walker MET  Bed to chair: SBA with Stand Step  with rolling walker   Car Transfer: SBA with rolling walker MET  Ambulation x 200'  feet with SBA and rolling walker MET  1 Step (Curb): Min A  and rolling walker MET  3 Steps: Min A  and B HR MET  right knee AROM flexion (in degrees): 90 MET  right knee AROM extension (in degrees): 0 MET  Independent with total knee HEP MET                       Time Tracking:     PT Received On:    PT Start Time: 1322     PT Stop Time: 1336  PT Total Time (min): 14 min     Billable Minutes: Gait Training 14    Treatment Type: Treatment  PT/PTA: PT     Number of PTA visits since last PT visit: 0     02/04/2025

## 2025-02-04 NOTE — DISCHARGE INSTRUCTIONS
[facility]  [address]  [place] [address]  Phone [phone]       /      Fax [phone]  SURGEON: Dr. Francis    After discharge, all questions or concerns should be handled at your surgeon's office ([phone]). If it is a weekend or after hours, you will get the surgeon on call.     Discharge Medications:    PAIN MANAGEMENT: Next Dose Available   Tylenol/Acetaminophen elixer 500mg- every 4 hours, around the clock (WHILE AWAKE) 5:30pm       COMPLICATION PREVENTION MEDS: Next Dose DUE   Aspirin 81mg twice a day for 6 weeks post-op for blood clot prevention PM on 2/6   Miralax 17gm or Senokot S/Ashley-Colace 8.6/50mg 2 tablets - once or twice a day while on narcotics and muscle relaxers for constipation prevention PM on 2/6   NOZIN NASAL  - twice a day for 2 weeks or until supply runs out, whichever comes first (Infection prevention) PM on 2/6     Total Knee Replacement                                                                                                                                    PAIN MEDICATIONS/PAIN MANAGEMENT: (Use the medication log in your discharge packet to keep track of your medications)  Tylenol/Acetaminophen 500mg every 4 hours, around the clock (WHILE AWAKE).     **NO MORE THAN 3000mg OF TYLENOL IN 24 HOURS**.     **Other things that help with pain control is WALKING, COMPRESSION WRAP, ICE and ELEVATION!!**    BLOOD CLOT PREVENTION:   Aspirin 81mg (blood thinner) - twice a day for 6 weeks for blood clot prevention. Start on the evening of 2/6/25. Stop on 3/20/25.    You need to continuing wearing your compression stockings (JOELLE Hose - ThromboEmbolic Disease Prevention Device) for the next 2-6 weeks post-op. It is ok to remove them for hygiene and at bedtime.   Hand wash and Dry. **If the swelling persists in the legs after you stop wearing the JOELLE hose, continue to wear them until the swelling decreases.**  REMOVE STOCKINGS AT  LEAST DAILY FOR SKIN ASSESSMENT.   Do NOT let the stockings roll down, creating a tourniquet around the back of your knee or ankle. If you need to, leave the excess at the bottom of the stocking.   The best thing you can do to prevent blood clots is to walk around as much as possible, AT LEAST EVERY 1-2 HOURS.       CONSTIPATION PREVENTION:   Miralax or Senokot S/Ashley-Colace and Stool softeners EVERY DAY while on pain meds.  Use other more aggressive over the counter LAXATIVES as needed for constipation (Examples: Milk of Magnesia, Dulcolax tabs or suppository, Magnesium Citrate, Fleet's Enema...etc.)   Drink lots of water.  Increase Fiber in diet.  Increase walking distance each day  DO NOT GO MORE THAN 2 DAYS WITHOUT HAVING A BOWEL MOVEMENT!    ACTIVITY:   Weight bearing precautions as follows:  FULL weight bearing to operative leg with walker.   DO NOT TAKE YOURSELF OFF OF THE WALKER TOO SOON. ALLOW YOUR OUTPATIENT THERAPIST or SURGEON TO GUIDE YOU.   Range of motion as tolerated. Work on BENDING and STRAIGHTENING your knee. Change positions often throughout the day. DO NOT PUT ANYTHING BEHIND THE KNEE, KEEPING IT IN A BENT POSITION.   Walk around at least every 1-2 hours while awake.   No heavy lifting, pulling, pushing or straining. Take it easy!  Home Health Physical Therapy at least 3 times per week. After 2 weeks, transition to outpatient physical therapy.      SWELLING PREVENTION AND TREATMENT:  Elevate affected extremity way ABOVE THE LEVEL OF THE HEART to reduce swelling (15-30 minutes at a time, 3 times a day).  Ice the Knee, thigh and lower leg AS MUCH AS POSSIBLE  Compression stockings and ace wrap around the knee and thigh as much as possible. Ok to remove at night for comfort.     WOUND CARE:     Dry dressing changes every other day and as needed for soiling for 14 days. Start SATURDAY.   Switch to every other day as soon as possible. NOTIFY MD OF EXCESSIVE WOUND DRAINAGE.     DO NOT TOUCH  INCISION(s). DO NOT WET THE INCISION(s). DO NOT apply any ointments, creams, lotions or antiseptics to the incision(s).   Ace wrap - apply your compression stocking to the lower leg and apply the ace wrap where the stocking stops for extra added compression to the knee and thigh.   May wet incision AFTER 2 weeks- (after you follow-up with your surgeon). Ok to shower before then if able to keep wound from getting wet (plastic barrier, saran wrap or cling wrap and tape).       URINARY RETENTION  If you start having difficulty urinating, decrease the use of Pain pills and muscle relaxers and notify your primary care doctor.     PNEUMONIA PREVENTION:  Stay out of bed as much as possible and walk around every 1-2 hours.  Continue breathing exercises (Incentive Spirometry) every 1-2 hours while mobility is limited and while you are on pain pills.    FALL PREVENTION:  Wear sturdy shoes that fit well - Wearing shoes with high heels or slippery soles, or shoes that are too loose, can lead to falls. Walking around in bare feet, or only socks, can also increase your risk of falling.  Use walker as long as your surgeon and therapist recommend it  Use good lighting and  throw rugs, electrical cords, furniture and clutter (anything than can cause you to trip at home.   Non-slip rug in bathroom or shower      INFECTION PREVENTION:  NOZIN ANTISEPTIC NASAL  - twice a day for 2 weeks or until supply runs out, whichever comes first. Shake bottle well, saturate cotton swab with 4 drops of antiseptic solution. Swab right nostril rim 6-8 times clockwise and counterclockwise. Take swab out, apply 2 more drops then swab left nostril rim 6-8 times clockwise and counterclockwise.   Proper handwashing before and after dressing changes. Do not wet the wound. Wound care instructions as written above. NOTIFY MD OF EXCESSIVE WOUND DRAINAGE.  No alcohol, smoking or tobacco products  Pets should not be allowed around the wound or  the dressing.   Treat UTI and skin infections as soon as possible.  Pre-medicate with antibiotics prior to dental or surgical procedures.   If you are diabetic, MAINTAIN GOOD BLOOD SUGAR CONTROL (Below 150) DURING YOUR RECOVERY. If you see high numbers, notify your primary care doctor.     Call your SURGEON'S OFFICE ([phone]) if you experience the following signs and symptoms of infection:   Unusual redness, swelling, excessive, cloudy or foul smelling drainage at the incision site.   Persistent low grade temp OR a temp greater than 102 F, unrelieved by Tylenol  Pain at surgical site, unrelieved by pain meds    Warning signs of a blood clot in your leg: (CALL YOUR SURGEON)  New onset or increasing pain in calf, new onset tenderness or redness above or below the knee or increasing swelling of your calf, ankle, or foot.  Warning signs that a blood clot has traveled to your lungs: (REPORT TO THE ER/CALL 327)  Sudden or increase in Shortness of breath, sudden onset of chest pains, or  Localized chest pain with coughing.       IF ANY ISSUES ARISE AND YOU FEEL THE NEED TO CALL YOUR PRIMARY CARE DOCTOR, PLEASE LET YOUR SURGEON KNOW AS WELL.     For emergencies, please report to OUR ([facility] or [facility] main [facility]) Emergency department and tell them to call YOUR SURGEON at [phone].     BEFORE MAKING ANY CHANGES TO THE MEDICAL CARE PLAN OR GOING TO THE EMERGENCY ROOM, PLEASE CONTACT THE SURGEON.      After discharge, all questions or concerns should be handled at your surgeon's office ([phone]). If it is a weekend or after hours, you will get the surgeon on call.     Lorin Knox RN, nurse navigator, available for questions or issues after discharge at [phone].

## 2025-02-04 NOTE — PLAN OF CARE
02/04/25 1240   Discharge Assessment   Assessment Type Discharge Planning Assessment   Source of Information patient   Does patient/caregiver understand observation status Yes   Communicated DEMETRICE with patient/caregiver Yes   Reason For Admission s/p TKR   People in Home alone   Do you expect to return to your current living situation? Yes   Do you have help at home or someone to help you manage your care at home? No   Prior to hospitilization cognitive status: Alert/Oriented   Current cognitive status: Alert/Oriented  (TALKING TRACH)   Walking or Climbing Stairs Difficulty yes   Walking or Climbing Stairs ambulation difficulty, requires equipment   Dressing/Bathing Difficulty yes   Dressing/Bathing bathing difficulty, requires equipment;dressing difficulty, assistance 1 person;dressing difficulty, requires equipment;bathing difficulty, assistance 1 person   Do you have any problems with: Errands/Grocery;Needs other help   Equipment Currently Used at Home walker, rolling;rollator;cane, straight   Readmission within 30 days? No   Patient currently being followed by outpatient case management? No   Do you currently have service(s) that help you manage your care at home? No   Do you take prescription medications? Yes   Do you have prescription coverage? Yes   Do you have any problems affording any of your prescribed medications? No   Is the patient taking medications as prescribed? yes   Who is going to help you get home at discharge? SISTER   How do you get to doctors appointments? car, drives self   Are you on dialysis? No   Do you take coumadin? No   Discharge Plan A Rehab   Discharge Plan B Home with family;Home Health   DME Needed Upon Discharge  walker, rolling   Discharge Plan discussed with: Patient   Transition of Care Barriers Mobility;No family/friends to help     Admitted w OA R Knee. Hx talking trach & Peg. Pt lives alone ( [facility]) & sisters ( Celeste & Anali) checks on him. Preop, CM had  extensive conversation w pt via phone re: expected hospital course, length of stay, & dcp options. Verb under. Pt is  & std [facility]. Encouraged pt to have alternative dcp if unable to transfer to inpt rehab postop. Verb under.     Postop, s/p TKR. Spk w pt - no change in dcp. Pt requesting [facility] rehab eval. Foc obtained. Pt has home rolator, RW, & cane. If [facility] rehab denied, plan [facility] eval. Will f/u.     Also recvd [facility] ( [facility])-- std she is aware that pt requesting inpt rehab stay.     Faxed referral to [facility] rehab. Await decision.     Contact # Celeste [phone]; Anali [phone]

## 2025-02-04 NOTE — PLAN OF CARE
Problem: Physical Therapy  Goal: Physical Therapy Goal  Description: Pt will improve functional independence by performing:    Bed mobility: SBA   Sit to stand: SBA with rolling walker MET  Bed to chair: SBA with Stand Step  with rolling walker   Car Transfer: SBA with rolling walker MET  Ambulation x 200'  feet with SBA and rolling walker MET  1 Step (Curb): Min A  and rolling walker MET  3 Steps: Min A  and B HR MET  right knee AROM flexion (in degrees): 90 MET  right knee AROM extension (in degrees): 0 MET  Independent with total knee HEP MET  Outcome: Progressing

## 2025-02-04 NOTE — PLAN OF CARE
Problem: Adult Inpatient Plan of Care  Goal: Plan of Care Review  Outcome: Progressing  Goal: Patient-Specific Goal (Individualized)  Outcome: Progressing  Goal: Absence of Hospital-Acquired Illness or Injury  Outcome: Progressing  Goal: Optimal Comfort and Wellbeing  Outcome: Progressing  Goal: Readiness for Transition of Care  Outcome: Progressing     Problem: Wound  Goal: Optimal Coping  Outcome: Progressing  Goal: Optimal Functional Ability  Outcome: Progressing  Goal: Absence of Infection Signs and Symptoms  Outcome: Progressing  Goal: Improved Oral Intake  Outcome: Progressing  Goal: Optimal Pain Control and Function  Outcome: Progressing  Goal: Skin Health and Integrity  Outcome: Progressing  Goal: Optimal Wound Healing  Outcome: Progressing     Problem: Skin Injury Risk Increased  Goal: Skin Health and Integrity  Outcome: Progressing     Problem: Knee Arthroplasty  Goal: Optimal Coping  Outcome: Progressing  Goal: Absence of Bleeding  Outcome: Progressing  Goal: Effective Bowel Elimination  Outcome: Progressing  Goal: Fluid and Electrolyte Balance  Outcome: Progressing  Goal: Optimal Functional Ability  Outcome: Progressing  Goal: Absence of Infection Signs and Symptoms  Outcome: Progressing  Goal: Intact Neurovascular Status  Outcome: Progressing  Goal: Anesthesia/Sedation Recovery  Outcome: Progressing  Goal: Optimal Pain Control and Function  Outcome: Progressing  Goal: Nausea and Vomiting Relief  Outcome: Progressing  Goal: Effective Urinary Elimination  Outcome: Progressing  Goal: Effective Oxygenation and Ventilation  Outcome: Progressing     Problem: Infection  Goal: Absence of Infection Signs and Symptoms  Outcome: Progressing

## 2025-02-05 LAB
ANION GAP SERPL CALC-SCNC: 6 MEQ/L
BUN SERPL-MCNC: 19.8 MG/DL (ref 8.4–25.7)
CALCIUM SERPL-MCNC: 8.7 MG/DL (ref 8.8–10)
CHLORIDE SERPL-SCNC: 105 MMOL/L (ref 98–107)
CO2 SERPL-SCNC: 27 MMOL/L (ref 23–31)
CREAT SERPL-MCNC: 0.94 MG/DL (ref 0.72–1.25)
CREAT/UREA NIT SERPL: 21
ERYTHROCYTE [DISTWIDTH] IN BLOOD BY AUTOMATED COUNT: 12 % (ref 11.5–17)
GFR SERPLBLD CREATININE-BSD FMLA CKD-EPI: >60 ML/MIN/1.73/M2
GLUCOSE SERPL-MCNC: 97 MG/DL (ref 82–115)
HCT VFR BLD AUTO: 33.7 % (ref 42–52)
HGB BLD-MCNC: 10.8 G/DL (ref 14–18)
MCH RBC QN AUTO: 28.6 PG (ref 27–31)
MCHC RBC AUTO-ENTMCNC: 32 G/DL (ref 33–36)
MCV RBC AUTO: 89.4 FL (ref 80–94)
NRBC BLD AUTO-RTO: 0 %
PLATELET # BLD AUTO: 157 X10(3)/MCL (ref 130–400)
PMV BLD AUTO: 12.7 FL (ref 7.4–10.4)
POTASSIUM SERPL-SCNC: 4.6 MMOL/L (ref 3.5–5.1)
RBC # BLD AUTO: 3.77 X10(6)/MCL (ref 4.7–6.1)
SODIUM SERPL-SCNC: 138 MMOL/L (ref 136–145)
WBC # BLD AUTO: 4.01 X10(3)/MCL (ref 4.5–11.5)

## 2025-02-05 PROCEDURE — 94761 N-INVAS EAR/PLS OXIMETRY MLT: CPT

## 2025-02-05 PROCEDURE — 99900035 HC TECH TIME PER 15 MIN (STAT)

## 2025-02-05 PROCEDURE — 25000003 PHARM REV CODE 250: Performed by: ORTHOPAEDIC SURGERY

## 2025-02-05 PROCEDURE — 97110 THERAPEUTIC EXERCISES: CPT | Mod: CQ

## 2025-02-05 PROCEDURE — 25000003 PHARM REV CODE 250: Performed by: NURSE PRACTITIONER

## 2025-02-05 PROCEDURE — 85027 COMPLETE CBC AUTOMATED: CPT | Performed by: ORTHOPAEDIC SURGERY

## 2025-02-05 PROCEDURE — 97116 GAIT TRAINING THERAPY: CPT

## 2025-02-05 PROCEDURE — 97165 OT EVAL LOW COMPLEX 30 MIN: CPT

## 2025-02-05 PROCEDURE — 36415 COLL VENOUS BLD VENIPUNCTURE: CPT | Performed by: ORTHOPAEDIC SURGERY

## 2025-02-05 PROCEDURE — 99900031 HC PATIENT EDUCATION (STAT)

## 2025-02-05 PROCEDURE — 80048 BASIC METABOLIC PNL TOTAL CA: CPT | Performed by: ORTHOPAEDIC SURGERY

## 2025-02-05 PROCEDURE — 11000001 HC ACUTE MED/SURG PRIVATE ROOM

## 2025-02-05 RX ADMIN — ASPIRIN 81 MG 81 MG: 81 TABLET ORAL at 09:02

## 2025-02-05 RX ADMIN — ACETAMINOPHEN 499.51 MG: 650 SOLUTION ORAL at 05:02

## 2025-02-05 RX ADMIN — KETOROLAC TROMETHAMINE 10 MG: 10 TABLET, FILM COATED ORAL at 01:02

## 2025-02-05 RX ADMIN — FAMOTIDINE 20 MG: 20 TABLET, FILM COATED ORAL at 09:02

## 2025-02-05 RX ADMIN — ACETAMINOPHEN 499.51 MG: 650 SOLUTION ORAL at 09:02

## 2025-02-05 RX ADMIN — ACETAMINOPHEN 499.51 MG: 650 SOLUTION ORAL at 01:02

## 2025-02-05 RX ADMIN — KETOROLAC TROMETHAMINE 10 MG: 10 TABLET, FILM COATED ORAL at 05:02

## 2025-02-05 RX ADMIN — LEVOTHYROXINE SODIUM 50 MCG: 0.05 TABLET ORAL at 06:02

## 2025-02-05 RX ADMIN — ACETAMINOPHEN 499.51 MG: 650 SOLUTION ORAL at 04:02

## 2025-02-05 RX ADMIN — GABAPENTIN 300 MG: 300 CAPSULE ORAL at 09:02

## 2025-02-05 NOTE — PLAN OF CARE
Recvd notification from [facility] rehab-- denied. Pt too high level; recommend home health.     Spk w pt -- notified that rehab MD denied. Pt ambulating 400ft mod I. Pt agreed to home w sister & hh tomorrow.     Discuss hh & outpt therapy options. Plan hh x 2 wks, then transition to outpt therapy.

## 2025-02-05 NOTE — PT/OT/SLP EVAL
"Occupational Therapy   Evaluation and Discharge Note    Name: Toro Bains  MRN: [id number]  Admitting Diagnosis: Primary osteoarthritis of right knee  Recent Surgery: Procedure(s) (LRB):  ROBOTIC ARTHROPLASTY, KNEE, TOTAL (Right) 2 Days Post-Op    Recommendations:     Discharge Recommendations: Low Intensity Therapy  Discharge Equipment Recommendations: bath bench  Barriers to discharge:  None    Assessment:     Toro Bains is a 74 y.o. male with a medical diagnosis of Primary osteoarthritis of right knee. At this time, patient does not require further acute OT services.     Plan:     During this hospitalization, patient does not require further acute OT services.  Please re-consult if situation changes.    Plan of Care Reviewed with: patient    Subjective     Chief Complaint: Mild pain in R knee   Patient/Family Comments/goals: "So what does all of this mean?"     Occupational Profile:  Living Environment: Pt lives alone. Pt has tub/shower combo, does not have grab bars or shower chair.   Previous level of function: Mod I  Roles and Routines: Does not work   Equipment Used at home: walker, rolling  Assistance upon Discharge: Pt does have family members that live near by that can help him when needed     Pain/Comfort:  Location - Side 1: Right  Location 1: knee  Pain Addressed 1: Pre-medicate for activity    Objective:     Communicated with: nursing prior to session.  Patient found supine with peripheral IV upon OT entry to room.    General Precautions: Standard, fall  Orthopedic Precautions: RLE weight bearing as tolerated  Respiratory Status: Room air     Occupational Performance:    Bed Mobility:    Patient completed Supine to Sit with independence    Functional Mobility/Transfers:  Patient completed Sit <> Stand Transfer with stand by assistance  with  rolling walker   Patient completed Toilet Transfer Step Transfer technique with stand by assistance with  rolling walker  Patient completed Tub " Transfer Step Transfer technique with contact guard assistance with rolling walker, grab bars, and step over technique   Functional Mobility: Pt performed FM in hallway with SBA using RW, ~200 ft. Tolerated well    Activities of Daily Living:  Lower Body Dressing: stand by assistance Pt donned pajama pants and donned/doffed socks without use of AE.     Cognitive/Visual Perceptual:  Cognitive/Psychosocial Skills:     -       Oriented to: Person, Place, Time, and Situation   -       Follows Commands/attention:Follows multistep  commands  -       Communication: clear/fluent  -       Memory: No Deficits noted  -       Safety awareness/insight to disability: intact   -       Mood/Affect/Coping skills/emotional control: Appropriate to situation  Visual/Perceptual:      -Intact      Physical Exam:  Upper Extremity Range of Motion:     -       Right Upper Extremity: WFL  -       Left Upper Extremity: WFL  Upper Extremity Strength:    -       Right Upper Extremity: WFL  -       Left Upper Extremity: WFL    Treatment & Education:  Pt educated on safe functional mobility using RW   Pt educated on tub/shower safety to decrease risk of falls     Patient left sitting edge of bed with all lines intact and call button in reach    DME Justifications:   Toro's mobility limitation cannot be sufficiently resolved by the use of a cane. His functional mobility deficit can be sufficiently resolved with the use of a Rolling Walker. Patient's mobility limitation significantly impairs their ability to participate in one of more activities of daily living.  The use of a RW will significantly improve the patient's ability to participate in MRADLS and the patient will use it on regular basis in the home.    History:     Past Medical History:   Diagnosis Date    Arthritis     Hypertension     Laryngeal cancer 1999    Thyroid disease      Past Surgical History:   Procedure Laterality Date    COLONOSCOPY      GASTROSTOMY TUBE PLACEMENT  12/2018     ROBOTIC ARTHROPLASTY, KNEE Right 2/3/2025    Procedure: ROBOTIC ARTHROPLASTY, KNEE, TOTAL;  Surgeon: Jeffrey Francis MD;  Location: [facility];  Service: Orthopedics;  Laterality: Right;    TRACHEAL SURGERY  09/28/2016     Time Tracking:     OT Date of Treatment: 02/05/25  OT Start Time: 1250  OT Stop Time: 1312  OT Total Time (min): 22 min    Billable Minutes:Evaluation 22    2/5/2025   normal...

## 2025-02-05 NOTE — PLAN OF CARE
Problem: Physical Therapy  Goal: Physical Therapy Goal  Description: Pt will improve functional independence by performing:    Bed mobility: SBA MET  Sit to stand: SBA with rolling walker MET  Bed to chair: SBA with Stand Step  with rolling walker MET  Car Transfer: SBA with rolling walker MET  Ambulation x 200'  feet with SBA and rolling walker MET  1 Step (Curb): Min A  and rolling walker MET  3 Steps: Min A  and B HR MET  right knee AROM flexion (in degrees): 90 MET  right knee AROM extension (in degrees): 0 MET  Independent with total knee HEP MET  Outcome: Progressing

## 2025-02-05 NOTE — PT/OT/SLP PROGRESS
Physical Therapy Treatment    Patient Name:  Toro Bains   MRN:  [id number]    Recommendations:     Discharge Recommendations: Low Intensity Therapy  Discharge Equipment Recommendations: walker, rolling  Barriers to discharge: None    Assessment:     Toro Bains is a 74 y.o. male admitted with a medical diagnosis of Primary osteoarthritis of right knee.  He presents with the following impairments/functional limitations: weakness, impaired endurance, impaired functional mobility, decreased lower extremity function, pain, decreased ROM, edema, orthopedic precautions .    Rehab Prognosis: Good; patient would benefit from acute skilled PT services to address these deficits and reach maximum level of function.    Recent Surgery: Procedure(s) (LRB):  ROBOTIC ARTHROPLASTY, KNEE, TOTAL (Right) 2 Days Post-Op    Plan:     During this hospitalization, patient to be seen BID to address the identified rehab impairments via therapeutic activities, gait training, therapeutic exercises and progress toward the following goals:    Plan of Care Expires:  02/07/25    Subjective     Chief Complaint: Anterior left knee/distal thigh pain  Patient/Family Comments/goals: discharge to inpatient rehab  Pain/Comfort:  Pain Rating 1: 4/10  Location - Side 1: Right  Location - Orientation 1: anterior  Location 1: knee  Pain Addressed 1: Pre-medicate for activity, Reposition, Distraction  Pain Rating Post-Intervention 1: 4/10      Objective:     Communicated with nsg prior to session.  Patient found up in chair with   upon PT entry to room.     General Precautions: Standard, fall  Orthopedic Precautions: RLE weight bearing as tolerated  Braces: N/A  Respiratory Status: Room air     Functional Mobility:  Bed Mobility:     Rolling Left:  supervision  Rolling Right: supervision  Scooting: supervision  Bridging: supervision  Supine to Sit: supervision  Sit to Supine: supervision  Transfers:     Sit to Stand:  stand by assistance with  rolling walker  Bed to Chair: stand by assistance with  rolling walker  using  Step Transfer  Gait: stand by assistance with RW, pt ambulated 325 with step-through gait, normal gait speed    Treatment & Education:  Pt educated on importance of out of bed mobility, frequent ambulation, and fall prevention.  Pt educated on pursed lip breathing technique and management of swelling through RICE method.  Pt performed 15 reps of total knee HEPs WITH RLE.    Patient left up in chair with call button in reach..    GOALS:   Multidisciplinary Problems       Physical Therapy Goals          Problem: Physical Therapy    Goal Priority Disciplines Outcome Interventions   Physical Therapy Goal     PT, PT/OT Progressing    Description: Pt will improve functional independence by performing:    Bed mobility: SBA MET  Sit to stand: SBA with rolling walker MET  Bed to chair: SBA with Stand Step  with rolling walker MET  Car Transfer: SBA with rolling walker MET  Ambulation x 200'  feet with SBA and rolling walker MET  1 Step (Curb): Min A  and rolling walker MET  3 Steps: Min A  and B HR MET  right knee AROM flexion (in degrees): 90 MET  right knee AROM extension (in degrees): 0 MET  Independent with total knee HEP MET                       DME Justifications:   Toro's mobility limitation cannot be sufficiently resolved by the use of a cane. His functional mobility deficit can be sufficiently resolved with the use of a Rolling Walker. Patient's mobility limitation significantly impairs their ability to participate in one of more activities of daily living.  The use of a RW will significantly improve the patient's ability to participate in MRADLS and the patient will use it on regular basis in the home.    Time Tracking:     PT Received On:    PT Start Time: 1315     PT Stop Time: 1338  PT Total Time (min): 23 min     Billable Minutes: Gait Training 10 min and Therapeutic Exercise 13 min    Treatment Type: Treatment  PT/PTA: PTA      Number of PTA visits since last PT visit: 1 02/05/2025

## 2025-02-05 NOTE — PT/OT/SLP PROGRESS
Physical Therapy Treatment    Patient Name:  Toro Bains   MRN:  [id number]    Recommendations:     Discharge Recommendations: Low Intensity Therapy  Discharge Equipment Recommendations: walker, rolling  Barriers to discharge: None    Assessment:     Toro Bains is a 74 y.o. male admitted with a medical diagnosis of Primary osteoarthritis of right knee.  He presents with the following impairments/functional limitations: weakness, impaired endurance, impaired functional mobility, decreased lower extremity function, pain, decreased ROM, edema, orthopedic precautions .    Rehab Prognosis: Good; patient would benefit from acute skilled PT services to address these deficits and reach maximum level of function.    Recent Surgery: Procedure(s) (LRB):  ROBOTIC ARTHROPLASTY, KNEE, TOTAL (Right) 2 Days Post-Op    Plan:     During this hospitalization, patient to be seen BID to address the identified rehab impairments via therapeutic activities, gait training, therapeutic exercises and progress toward the following goals:    Plan of Care Expires:  02/07/25    Subjective     Chief Complaint: R knee pain  Patient/Family Comments/goals:   Pain/Comfort:  Location - Side 1: Right  Location 1: knee  Pain Addressed 1: Reposition, Distraction      Objective:     Communicated with nurse prior to session.  Patient found supine with peripheral IV, telemetry upon PT entry to room.     General Precautions: Standard, fall  Orthopedic Precautions: RLE weight bearing as tolerated  Braces: N/A  Respiratory Status: Room air     Functional Mobility:  Bed Mobility:     Supine to Sit: supervision  Transfers:     Sit to Stand:  stand by assistance with rolling walker  Gait: Pt ambulated 400 ft w rw and SBA, using step through gait pattern at normal pace        (In degrees) AROM PROM   R knee flexion 105 110   R knee extension 0         Treatment & Education:  Pt edu on total knee protocol and importance of frequent mobility  Pt  completed seated TKA therex x10 AAROM    Patient left up in chair with all lines intact, call button in reach, nurse notified    GOALS:   Multidisciplinary Problems       Physical Therapy Goals          Problem: Physical Therapy    Goal Priority Disciplines Outcome Interventions   Physical Therapy Goal     PT, PT/OT Progressing    Description: Pt will improve functional independence by performing:    Bed mobility: SBA   Sit to stand: SBA with rolling walker MET  Bed to chair: SBA with Stand Step  with rolling walker   Car Transfer: SBA with rolling walker MET  Ambulation x 200'  feet with SBA and rolling walker MET  1 Step (Curb): Min A  and rolling walker MET  3 Steps: Min A  and B HR MET  right knee AROM flexion (in degrees): 90 MET  right knee AROM extension (in degrees): 0 MET  Independent with total knee HEP MET                       DME Justifications:   Toro's mobility limitation cannot be sufficiently resolved by the use of a cane. His functional mobility deficit can be sufficiently resolved with the use of a Rolling Walker. Patient's mobility limitation significantly impairs their ability to participate in one of more activities of daily living.  The use of a RW will significantly improve the patient's ability to participate in MRADLS and the patient will use it on regular basis in the home.    Time Tracking:     PT Received On:    PT Start Time: 0947     PT Stop Time: 1000  PT Total Time (min): 13 min     Billable Minutes: Gait Training 13    Treatment Type: Treatment  PT/PTA: PT     Number of PTA visits since last PT visit: 0     02/05/2025

## 2025-02-06 VITALS
RESPIRATION RATE: 20 BRPM | DIASTOLIC BLOOD PRESSURE: 75 MMHG | SYSTOLIC BLOOD PRESSURE: 114 MMHG | OXYGEN SATURATION: 100 % | BODY MASS INDEX: 17.44 KG/M2 | HEIGHT: 69 IN | WEIGHT: 117.75 LBS | HEART RATE: 75 BPM | TEMPERATURE: 98 F

## 2025-02-06 LAB — VIEW PATHOLOGY REPORT (RELIAPATH): NORMAL

## 2025-02-06 PROCEDURE — 97116 GAIT TRAINING THERAPY: CPT

## 2025-02-06 PROCEDURE — 25000003 PHARM REV CODE 250: Performed by: NURSE PRACTITIONER

## 2025-02-06 PROCEDURE — 99900031 HC PATIENT EDUCATION (STAT)

## 2025-02-06 PROCEDURE — 25000003 PHARM REV CODE 250: Performed by: ORTHOPAEDIC SURGERY

## 2025-02-06 PROCEDURE — 99900035 HC TECH TIME PER 15 MIN (STAT)

## 2025-02-06 PROCEDURE — 94761 N-INVAS EAR/PLS OXIMETRY MLT: CPT

## 2025-02-06 RX ORDER — FAMOTIDINE 20 MG/1
20 TABLET, FILM COATED ORAL DAILY
Status: DISCONTINUED | OUTPATIENT
Start: 2025-02-07 | End: 2025-02-06 | Stop reason: HOSPADM

## 2025-02-06 RX ADMIN — ACETAMINOPHEN 499.51 MG: 650 SOLUTION ORAL at 09:02

## 2025-02-06 RX ADMIN — ASPIRIN 81 MG 81 MG: 81 TABLET ORAL at 09:02

## 2025-02-06 RX ADMIN — LEVOTHYROXINE SODIUM 50 MCG: 0.05 TABLET ORAL at 05:02

## 2025-02-06 RX ADMIN — KETOROLAC TROMETHAMINE 10 MG: 10 TABLET, FILM COATED ORAL at 05:02

## 2025-02-06 RX ADMIN — ACETAMINOPHEN 499.51 MG: 650 SOLUTION ORAL at 01:02

## 2025-02-06 RX ADMIN — FAMOTIDINE 20 MG: 20 TABLET, FILM COATED ORAL at 09:02

## 2025-02-06 RX ADMIN — ACETAMINOPHEN 499.51 MG: 650 SOLUTION ORAL at 05:02

## 2025-02-06 RX ADMIN — KETOROLAC TROMETHAMINE 10 MG: 10 TABLET, FILM COATED ORAL at 01:02

## 2025-02-06 NOTE — PROGRESS NOTES
Pharmacist Renal Dose Adjustment Note    Toro Bains is a 74 y.o. male being treated with the medication famotidine    Patient Data:    Vital Signs (Most Recent):  Temp: 98.2 °F (36.8 °C) (02/06/25 1106)  Pulse: 74 (02/06/25 1106)  Resp: 20 (02/06/25 0811)  BP: 126/75 (02/06/25 1106)  SpO2: 100 % (02/06/25 1106) Vital Signs (72h Range):  Temp:  [97.3 °F (36.3 °C)-98.5 °F (36.9 °C)]   Pulse:  [49-89]   Resp:  [16-20]   BP: ()/(40-75)   SpO2:  [98 %-100 %]      Recent Labs   Lab 02/04/25  0449 02/05/25  0450   CREATININE 0.86 0.94     Serum creatinine: 0.94 mg/dL 02/05/25 0450  Estimated creatinine clearance: 53.7 mL/min    Medication:famotidine dose: 20mg frequency q12h will be changed to medication:famotidine dose:20mg frequency:daily for CrCl 30-59    Pharmacist's Name: Albino Salinas  Pharmacist's Extension: [phone]

## 2025-02-06 NOTE — PLAN OF CARE
Faxed hh referral to [facility]. Plan dc today.     Faxed referral for outpt to [facility].T.-- to start in 2 wks. They will contact pt with appt date & time.     [facility] ( attn Gabby f [phone]; ph [phone]) re: auth for hh.     [facility]ed [facility] auth for approved outpt PT/OT to [facility].T.

## 2025-02-06 NOTE — NURSING
Discharge teaching done . Pt voiced understanding. Copy of instructions, prescriptions and dressing supplies given to pt. Pt stated has a walker for home use.  Stated feels ready for discharge.  Waiting for ride

## 2025-02-06 NOTE — NURSING
2/6/2025   3:28 PM    Nurse Note:       Prior to discharge, the Patient/Support Person was educated and was able to verbalize understanding on following:    [x] Yes   [] No   [] Further Education Provided    Knee Replacement Specific Education   Pain management, Medication Management and Prescriptions  Activity level  Orthopedic precautions/braces - N/A  Complication Prevention to include: Constipation, post-op urinary retention, pneumonia, bleeding, falls, infection, DVT prophylaxis and nausea vomiting  Wound care Instructions/Bandages provided  Next dose due for pain and complication prevention medications      Perception of Care - Joint Replacement Population Total Joint Surgery List: KNEE    Patient able to verbalize one way to treat/prevent SWELLING at home   [x] Yes   [] No   [] Further Education Provided      Attending Nurse:  Kimberly

## 2025-02-06 NOTE — PROGRESS NOTES
"No acute events overnight.  Pain controlled.  Resting in bed. Denied rehab    Vital Signs  Temp: 97.8 °F (36.6 °C)  Temp Source: Oral  Pulse: 80  Heart Rate Source: Monitor  Resp: 18  SpO2: 98 %  Pulse Oximetry Type: Intermittent  Flow (L/min) (Oxygen Therapy): 6  Device (Oxygen Therapy): room air  BP: 114/68  BP Location: Left arm  BP Method: Automatic  Patient Position: Lying  Height and Weight  Height: 5' 9" (175.3 cm)  Height Method: Stated  Weight: 53.4 kg (117 lb 11.6 oz)  Weight Method: Standard Scale  BSA (Calculated - sq m): 1.61 sq meters  BMI (Calculated): 17.4  Weight in (lb) to have BMI = 25: 168.9]    +FHL/EHL  BCR distally  Dressing c/d/i  SILT distally    Recent Lab Results       None            A/P:  Status post TKA  Pain controlled  Overall patient doing well.  Therapy for mobility and ambulation.  ASA for DVT PPx  Home later today with sister  "

## 2025-02-06 NOTE — PT/OT/SLP PROGRESS
Physical Therapy Treatment    Patient Name:  Toro Bains   MRN:  [id number]    Recommendations:     Discharge Recommendations: Low Intensity Therapy  Discharge Equipment Recommendations: walker, rolling  Barriers to discharge: None    Assessment:     Toro Bains is a 74 y.o. male admitted with a medical diagnosis of Primary osteoarthritis of right knee.  He presents with the following impairments/functional limitations: weakness, impaired endurance, impaired functional mobility, decreased lower extremity function, pain, decreased ROM, edema, orthopedic precautions .    Rehab Prognosis: Good; patient would benefit from acute skilled PT services to address these deficits and reach maximum level of function.    Recent Surgery: Procedure(s) (LRB):  ROBOTIC ARTHROPLASTY, KNEE, TOTAL (Right) 3 Days Post-Op    Plan:     During this hospitalization, patient to be seen BID to address the identified rehab impairments via therapeutic activities, gait training, therapeutic exercises and progress toward the following goals:    Plan of Care Expires:  02/07/25    Subjective     Chief Complaint: R knee pain  Patient/Family Comments/goals:   Pain/Comfort:  Location - Side 1: Right  Location 1: knee  Pain Addressed 1: Pre-medicate for activity, Reposition, Distraction      Objective:     Communicated with nurse prior to session.  Patient found ambulatory in room/chew with peripheral IV, telemetry upon PT entry to room.     General Precautions: Standard, fall  Orthopedic Precautions: RLE weight bearing as tolerated  Braces: N/A  Respiratory Status: Room air     Functional Mobility:  Transfers:     Sit to Stand:  modified independence with rolling walker  Gait: Pt ambulated 400 ft w rw and mod I, using step through gait pattern at normal pace      (In degrees) AROM PROM   R knee flexion 105 110   R knee extension 0         Treatment & Education:  Pt edu on total knee protocol and importance of frequent mobility  Pt  completed seated TKA therex x10 AAROM    Patient left up in chair with all lines intact, call button in reach, nurse notified    GOALS:   Multidisciplinary Problems       Physical Therapy Goals          Problem: Physical Therapy    Goal Priority Disciplines Outcome Interventions   Physical Therapy Goal     PT, PT/OT Progressing    Description: Pt will improve functional independence by performing:    Bed mobility: SBA MET  Sit to stand: SBA with rolling walker MET  Bed to chair: SBA with Stand Step  with rolling walker MET  Car Transfer: SBA with rolling walker MET  Ambulation x 200'  feet with SBA and rolling walker MET  1 Step (Curb): Min A  and rolling walker MET  3 Steps: Min A  and B HR MET  right knee AROM flexion (in degrees): 90 MET  right knee AROM extension (in degrees): 0 MET  Independent with total knee HEP MET                       DME Justifications:   Toro's mobility limitation cannot be sufficiently resolved by the use of a cane. His functional mobility deficit can be sufficiently resolved with the use of a Rolling Walker. Patient's mobility limitation significantly impairs their ability to participate in one of more activities of daily living.  The use of a RW will significantly improve the patient's ability to participate in MRADLS and the patient will use it on regular basis in the home.    Time Tracking:     PT Received On:    PT Start Time: 1058     PT Stop Time: 1111  PT Total Time (min): 13 min     Billable Minutes: Gait Training 13    Treatment Type: Treatment  PT/PTA: PT     Number of PTA visits since last PT visit: 0     02/06/2025

## 2025-02-07 ENCOUNTER — TELEPHONE (OUTPATIENT)
Dept: ORTHOPEDICS | Facility: CLINIC | Age: 75
End: 2025-02-07
Payer: OTHER GOVERNMENT

## 2025-02-07 NOTE — TELEPHONE ENCOUNTER
Caitlin with [facility] called and LVM requesting the authorization from [facility] for physical [facility] faxed to them at [phone]    I faxed the [facility] auth to them.

## 2025-02-10 ENCOUNTER — TELEPHONE (OUTPATIENT)
Dept: ORTHOPEDICS | Facility: CLINIC | Age: 75
End: 2025-02-10
Payer: OTHER GOVERNMENT

## 2025-02-10 NOTE — TELEPHONE ENCOUNTER
Ortho post op follow up call completed (R TKA) Reminded pt to wear JOELLE hose on both legs and ace bandage on operative leg during the daytime and ok to remove for hygiene and at night. Reminded to use ice for swelling, elevate operative leg higher than heart level several times daily and walk around the house 3-4 times per hour with walker, verbalized understanding. Pt will have [facility] x 2 weeks, then transition to [facility] afterwards. Reminded of post op follow up appointment with Kate on 2-18 at 2:45 and encouraged to call with any questions or concerns.

## 2025-02-18 ENCOUNTER — HOSPITAL ENCOUNTER (OUTPATIENT)
Dept: RADIOLOGY | Facility: CLINIC | Age: 75
Discharge: HOME | End: 2025-02-18
Attending: NURSE PRACTITIONER
Payer: OTHER GOVERNMENT

## 2025-02-18 ENCOUNTER — OFFICE VISIT (OUTPATIENT)
Dept: ORTHOPEDICS | Facility: CLINIC | Age: 75
End: 2025-02-18
Payer: OTHER GOVERNMENT

## 2025-02-18 VITALS
BODY MASS INDEX: 17.33 KG/M2 | WEIGHT: 117 LBS | DIASTOLIC BLOOD PRESSURE: 77 MMHG | HEIGHT: 69 IN | SYSTOLIC BLOOD PRESSURE: 118 MMHG | HEART RATE: 67 BPM

## 2025-02-18 DIAGNOSIS — Z96.651 STATUS POST TOTAL RIGHT KNEE REPLACEMENT: ICD-10-CM

## 2025-02-18 DIAGNOSIS — Z96.651 STATUS POST TOTAL RIGHT KNEE REPLACEMENT: Primary | ICD-10-CM

## 2025-02-18 RX ORDER — HYDROCODONE BITARTRATE AND ACETAMINOPHEN 5; 325 MG/1; MG/1
1 TABLET ORAL EVERY 6 HOURS PRN
Qty: 28 TABLET | Refills: 0 | Status: SHIPPED | OUTPATIENT
Start: 2025-02-18 | End: 2025-02-25

## 2025-02-18 RX ORDER — HYDROCODONE BITARTRATE AND ACETAMINOPHEN 5; 325 MG/1; MG/1
1 TABLET ORAL EVERY 6 HOURS PRN
Qty: 12 TABLET | Refills: 0 | Status: SHIPPED | OUTPATIENT
Start: 2025-02-18 | End: 2025-02-21

## 2025-02-18 RX ORDER — METHOCARBAMOL 750 MG/1
750 TABLET, FILM COATED ORAL 3 TIMES DAILY
Qty: 42 TABLET | Refills: 0 | Status: SHIPPED | OUTPATIENT
Start: 2025-02-18 | End: 2025-03-04

## 2025-02-18 NOTE — PROGRESS NOTES
Chief Complaint:   Chief Complaint   Patient presents with    Right Knee - Post-op Evaluation     Pt states he is doing very well. He is managing the pain with Tylenol PRN. He is doing PT 2 x a week with HH but he is about to transition to outpatient PT next week. Denies pain. No complaints.        History of present illness: Toro Bains is a 74 y.o. male, presents to clinic today in regards to his right knee.  Patient is 2 weeks out from surgery.  Overall he is doing very well.  Ambulating with the assistance of a cane.  Currently working with physical therapy for strengthening, range motion, mobility.  In regards to his pain he is managing with Tylenol unfortunately was unable to get most of this refilled due to the [facility]. incision site is covered with dressing that is clean dry and intact.  Patient denies any active drainage or bleeding.    History of Present Illness               Past Medical History:   Diagnosis Date    Arthritis     Hypertension     Laryngeal cancer 1999    Thyroid disease        Past Surgical History:   Procedure Laterality Date    COLONOSCOPY      GASTROSTOMY TUBE PLACEMENT  12/2018    ROBOTIC ARTHROPLASTY, KNEE Right 2/3/2025    Procedure: ROBOTIC ARTHROPLASTY, KNEE, TOTAL;  Surgeon: Jeffrey Francis MD;  Location: [facility];  Service: Orthopedics;  Laterality: Right;    TRACHEAL SURGERY  09/28/2016       Current Outpatient Medications   Medication Sig    acetaminophen (TYLENOL) 160 mg/5 mL Liqd 15.6 mLs (499.2 mg total) by Per G Tube route every 4 (four) hours as needed (pain).    aspirin 81 MG Chew Take 1 tablet (81 mg total) by mouth 2 (two) times a day. Blood clot prevention    inositol-choline drb-eubmc-B-C (LIPO-FLAVONOID) 500 mg Tab Take 1 tablet by mouth once daily.    levothyroxine (SYNTHROID) 50 MCG tablet Take 1 tablet by mouth every morning.    multivitamin with folic acid 400 mcg Tab Take 1 tablet by mouth every morning.    multivitamin with iron Tab     polyethylene  glycol (GLYCOLAX) 17 gram PwPk Take 17 g by mouth once daily. Constipation PREVENTION for 14 days    HYDROcodone-acetaminophen (NORCO) 5-325 mg per tablet Take 1 tablet by mouth every 6 (six) hours as needed for Pain.    methocarbamoL (ROBAXIN) 750 MG Tab Take 1 tablet (750 mg total) by mouth 3 (three) times daily. for 14 days     No current facility-administered medications for this visit.       Review of patient's allergies indicates:  No Known Allergies    Family History   Problem Relation Name Age of Onset    Hypertension Mother      No Known Problems Father         Social History[1]      Review of Systems:    Denies fevers, chills, chest pain, shortness of breath. Comprehensive review of systems performed and otherwise negative except as noted in HPI      Physical Examination:    General: awake and alert, no acute distress, healthy appearing  Head and Neck: Head atraumatic/normocephalic. Moist MM  CV: brisk cap refill  Lungs: non-labored breathing, w/o cough or SOB  Skin: no rashes present, warm to touch  Neuro: sensation grossly intact distall     Vital Signs:    Vitals:    02/18/25 1508   BP: 118/77   Pulse: 67       Body mass index is 17.28 kg/m².    Examination right knee:    Incision clean dry and intact. No erythema or drainage or signs of infection.  Sensation intact distally to right foot  Positive FHL/EHL/gastrocsoleus/tib ant  Brisk capillary refill to right foot  No swelling or signs of DVT  Range of motion: extension at 10 and flexion at 100  Stable to varus valgus  Stable to anterior and posterior drawer    X-rays: 3 views of the right knee reviewed. Patient's implants appear well fixed. No signs of loosening or subsidence noted.     Assessment::post op status post R TKA    Plan:  Patient presents to the clinic today in regards to the right knee.  He is 2 weeks out from surgery.  His knee is stable on exam x-rays today here in clinic.  Incision sites well healed and staples were discontinued.   Patient can now shower without fully submerging in water or applying any lotions for least 1 week.  He is to continue physical therapy for strengthening, range of motion, mobility.  Patient was given a prescription for pain medications as he is [facility] pain medication and a prescription of muscle relaxants.  He is to follow up here in the office in 4 weeks to reassess his knee.  He states understanding and agrees with plan of care.       This note was created using [employer] voice recognition software that occasionally misinterpreted phrases or words.    Consult note is delivered via Epic messaging service.           [1]   Social History  Socioeconomic History    Marital status: Unknown   Tobacco Use    Smoking status: Former     Current packs/day: 0.00     Types: Cigarettes     Quit date: 1998     Years since quittin.4    Smokeless tobacco: Never   Substance and Sexual Activity    Alcohol use: No    Drug use: No    Sexual activity: Not Currently     Social Drivers of Health     Financial Resource Strain: Patient Declined (2/3/2025)    Overall Financial Resource Strain (CARDIA)     Difficulty of Paying Living Expenses: Patient declined   Food Insecurity: Patient Declined (2/3/2025)    Hunger Vital Sign     Worried About Running Out of Food in the Last Year: Patient declined     Ran Out of Food in the Last Year: Patient declined   Transportation Needs: Patient Declined (2/3/2025)    TRANSPORTATION NEEDS     Transportation : Patient declined   Stress: Patient Declined (2/3/2025)    [employer] [place] [employer] - Occupational Stress Questionnaire     Feeling of Stress : Patient declined   Housing Stability: Patient Declined (2/3/2025)    Housing Stability Vital Sign     Unable to Pay for Housing in the Last Year: Patient declined     Homeless in the Last Year: Patient declined

## 2025-03-20 ENCOUNTER — HOSPITAL ENCOUNTER (OUTPATIENT)
Dept: RADIOLOGY | Facility: CLINIC | Age: 75
Discharge: HOME | End: 2025-03-20
Attending: NURSE PRACTITIONER
Payer: OTHER GOVERNMENT

## 2025-03-20 ENCOUNTER — TELEPHONE (OUTPATIENT)
Dept: ORTHOPEDICS | Facility: CLINIC | Age: 75
End: 2025-03-20

## 2025-03-20 ENCOUNTER — OFFICE VISIT (OUTPATIENT)
Dept: ORTHOPEDICS | Facility: CLINIC | Age: 75
End: 2025-03-20
Payer: OTHER GOVERNMENT

## 2025-03-20 VITALS
WEIGHT: 117 LBS | DIASTOLIC BLOOD PRESSURE: 72 MMHG | BODY MASS INDEX: 17.33 KG/M2 | HEART RATE: 69 BPM | SYSTOLIC BLOOD PRESSURE: 112 MMHG | HEIGHT: 69 IN

## 2025-03-20 DIAGNOSIS — Z96.651 STATUS POST TOTAL RIGHT KNEE REPLACEMENT: ICD-10-CM

## 2025-03-20 DIAGNOSIS — Z96.651 STATUS POST TOTAL RIGHT KNEE REPLACEMENT: Primary | ICD-10-CM

## 2025-03-20 DIAGNOSIS — M17.12 PRIMARY OSTEOARTHRITIS OF LEFT KNEE: ICD-10-CM

## 2025-03-20 PROCEDURE — 73564 X-RAY EXAM KNEE 4 OR MORE: CPT | Mod: RT,,, | Performed by: NURSE PRACTITIONER

## 2025-03-20 RX ORDER — METHOCARBAMOL 750 MG/1
750 TABLET, FILM COATED ORAL 3 TIMES DAILY
Qty: 42 TABLET | Refills: 0 | Status: SHIPPED | OUTPATIENT
Start: 2025-03-20 | End: 2025-04-03

## 2025-03-20 RX ORDER — LIDOCAINE HYDROCHLORIDE 20 MG/ML
5 INJECTION, SOLUTION EPIDURAL; INFILTRATION; INTRACAUDAL; PERINEURAL
Status: DISCONTINUED | OUTPATIENT
Start: 2025-03-20 | End: 2025-03-20 | Stop reason: HOSPADM

## 2025-03-20 RX ORDER — METHOCARBAMOL 750 MG/1
500 TABLET, FILM COATED ORAL 4 TIMES DAILY
COMMUNITY

## 2025-03-20 RX ORDER — BETAMETHASONE SODIUM PHOSPHATE AND BETAMETHASONE ACETATE 3; 3 MG/ML; MG/ML
12 INJECTION, SUSPENSION INTRA-ARTICULAR; INTRALESIONAL; INTRAMUSCULAR; SOFT TISSUE
Status: DISCONTINUED | OUTPATIENT
Start: 2025-03-20 | End: 2025-03-20 | Stop reason: HOSPADM

## 2025-03-20 RX ADMIN — LIDOCAINE HYDROCHLORIDE 5 ML: 20 INJECTION, SOLUTION EPIDURAL; INFILTRATION; INTRACAUDAL; PERINEURAL at 09:03

## 2025-03-20 RX ADMIN — BETAMETHASONE SODIUM PHOSPHATE AND BETAMETHASONE ACETATE 12 MG: 3; 3 INJECTION, SUSPENSION INTRA-ARTICULAR; INTRALESIONAL; INTRAMUSCULAR; SOFT TISSUE at 09:03

## 2025-03-20 NOTE — PROCEDURES
Large Joint Aspiration/Injection: L knee    Date/Time: 3/20/2025 9:45 AM    Performed by: Kate Engel FNP  Authorized by: Kate Engel FNP    Consent Done?:  Yes (Verbal)  Indications:  Arthritis and pain  Timeout: prior to procedure the correct patient, procedure, and site was verified    Prep: patient was prepped and draped in usual sterile fashion    Local anesthesia used?: No      Details:  Needle Size:  22 G  Ultrasonic Guidance for needle placement?: No    Approach:  Anterolateral  Location:  Knee  Site:  L knee  Medications:  5 mL LIDOcaine (PF) 20 mg/mL (2%) 20 mg/mL (2 %); 12 mg betamethasone acetate-betamethasone sodium phosphate 6 mg/mL  Patient tolerance:  Patient tolerated the procedure well with no immediate complications

## 2025-03-20 NOTE — PROGRESS NOTES
Chief Complaint:   Chief Complaint   Patient presents with    Right Knee - Follow-up     Pt states he is doing good. Going to PT 3 x a week . Denies pain or discomfort. No complaints. Needs refill for Robaxin.        History of present illness: Toro Bains is a 74 y.o. male, presents to clinic today in regards to bilateral knees.  In regards to the right knee he is about 6 weeks out from right total knee arthroplasty.  Overall he is doing well.  Denies any active pain or discomfort in the knee.  He does continue to have some muscle spasms here and there.  We would like a refill of his Robaxin today here in clinic.  In regards to therapy he has completed this and done well with this.  Ambulating without any assistance.  Regards to left knee he does have a long known history of osteoarthritis.  At this time he would like to continue with conservative treatment as his right knee is still rehabbing.  He would like a cortisone injection today here in clinic.    Past Medical History:   Diagnosis Date    Arthritis     Hypertension     Laryngeal cancer 1999    Thyroid disease        Past Surgical History:   Procedure Laterality Date    COLONOSCOPY      GASTROSTOMY TUBE PLACEMENT  12/2018    ROBOTIC ARTHROPLASTY, KNEE Right 2/3/2025    Procedure: ROBOTIC ARTHROPLASTY, KNEE, TOTAL;  Surgeon: Jeffrey Francis MD;  Location: [facility];  Service: Orthopedics;  Laterality: Right;    TRACHEAL SURGERY  09/28/2016       Current Outpatient Medications   Medication Sig    inositol-choline keg-owfje-U-C (LIPO-FLAVONOID) 500 mg Tab Take 1 tablet by mouth once daily.    levothyroxine (SYNTHROID) 50 MCG tablet Take 1 tablet by mouth every morning.    methocarbamoL (ROBAXIN) 750 MG Tab Take 500 mg by mouth 4 (four) times daily.    multivitamin with folic acid 400 mcg Tab Take 1 tablet by mouth every morning.    multivitamin with iron Tab     aspirin 81 MG Chew Take 1 tablet (81 mg total) by mouth 2 (two) times a day. Blood clot  prevention    methocarbamoL (ROBAXIN) 750 MG Tab Take 1 tablet (750 mg total) by mouth 3 (three) times daily. for 14 days     No current facility-administered medications for this visit.       Review of patient's allergies indicates:  No Known Allergies    Family History   Problem Relation Name Age of Onset    Hypertension Mother      No Known Problems Father         Social History[1]      Review of Systems:    Denies fevers, chills, chest pain, shortness of breath. Comprehensive review of systems performed and otherwise negative except as noted in HPI      Physical Examination:    General: awake and alert, no acute distress, healthy appearing  Head and Neck: Head atraumatic/normocephalic. Moist MM  CV: brisk cap refill  Lungs: non-labored breathing, w/o cough or SOB  Skin: no rashes present, warm to touch  Neuro: sensation grossly intact distall     Vital Signs:    Vitals:    03/20/25 0953   BP: 112/72   Pulse: 69       Body mass index is 17.28 kg/m².    Examination right knee:    Incision clean dry and intact. No erythema or drainage or signs of infection.  Sensation intact distally to right foot  Positive FHL/EHL/gastrocsoleus/tib ant  Brisk capillary refill to right foot  No swelling or signs of DVT  Range of motion: extension at 0 and flexion at 110  Stable to varus valgus  Stable to anterior and posterior drawer    Left knee:  Skin warm, dry, intact.  Crepitus noted throughout range of motion.  Sensation intact distally.    X-rays: 3 views of the right knee reviewed. Patient's implants appear well fixed. No signs of loosening or subsidence noted.     Assessment::post op status post R TKA & L knee primary OA    Plan:  Patient presents to the clinic in regards to bilateral knees.  Right knee is stable on exam x-rays today here in clinic.  Doing very well status post his surgery.  In regards to left knee we did spoke about his different options going forward he would like to hold off for any surgical intervention  until July or August.  We will give him a cortisone injection to help calm his knee pain down today here in clinic.  He is to call if needing to be seen sooner than this.  He is to follow up in 2 months for his right knee.  Patient states understanding and agrees with plan of care.       This note was created using [employer] voice recognition software that occasionally misinterpreted phrases or words.    Consult note is delivered via Epic messaging service.           [1]   Social History  Socioeconomic History    Marital status: Unknown   Tobacco Use    Smoking status: Former     Current packs/day: 0.00     Types: Cigarettes     Quit date: 1998     Years since quittin.4    Smokeless tobacco: Never   Substance and Sexual Activity    Alcohol use: No    Drug use: No    Sexual activity: Not Currently     Social Drivers of Health     Financial Resource Strain: Patient Declined (2/3/2025)    Overall Financial Resource Strain (CARDIA)     Difficulty of Paying Living Expenses: Patient declined   Food Insecurity: Patient Declined (2/3/2025)    Hunger Vital Sign     Worried About Running Out of Food in the Last Year: Patient declined     Ran Out of Food in the Last Year: Patient declined   Transportation Needs: Patient Declined (2/3/2025)    TRANSPORTATION NEEDS     Transportation : Patient declined   Stress: Patient Declined (2/3/2025)    [place] [employer] - Occupational Stress Questionnaire     Feeling of Stress : Patient declined   Housing Stability: Patient Declined (2/3/2025)    Housing Stability Vital Sign     Unable to Pay for Housing in the Last Year: Patient declined     Homeless in the Last Year: Patient declined

## 2025-03-20 NOTE — TELEPHONE ENCOUNTER
Patients [facility] expires on 3/12/2025 .   Next Appt: 3/20/2025     I completed and faxed an RFS request to continue care. (See media)  Date faxed: 3/18/2025

## 2025-03-26 ENCOUNTER — EXTERNAL HOME HEALTH (OUTPATIENT)
Dept: HOME HEALTH SERVICES | Facility: HOSPITAL | Age: 75
End: 2025-03-26
Payer: OTHER GOVERNMENT

## 2025-03-27 ENCOUNTER — DOCUMENT SCAN (OUTPATIENT)
Dept: HOME HEALTH SERVICES | Facility: HOSPITAL | Age: 75
End: 2025-03-27
Payer: OTHER GOVERNMENT

## 2025-05-20 ENCOUNTER — OFFICE VISIT (OUTPATIENT)
Dept: ORTHOPEDICS | Facility: CLINIC | Age: 75
End: 2025-05-20
Payer: OTHER GOVERNMENT

## 2025-05-20 DIAGNOSIS — Z96.651 AFTERCARE FOLLOWING RIGHT KNEE JOINT REPLACEMENT SURGERY: Primary | ICD-10-CM

## 2025-05-20 DIAGNOSIS — Z47.1 AFTERCARE FOLLOWING RIGHT KNEE JOINT REPLACEMENT SURGERY: Primary | ICD-10-CM

## 2025-05-20 PROCEDURE — 99214 OFFICE O/P EST MOD 30 MIN: CPT | Mod: ,,, | Performed by: ORTHOPAEDIC SURGERY

## 2025-05-20 NOTE — PROGRESS NOTES
Chief Complaint:   Chief Complaint   Patient presents with    Right Knee - Follow-up     F/U R TKA 2/3/25. Pt states knee was doing good but has some swelling and feels some movement medially when moving knee. ROM is good. Still has pain, especially at night. Not much pain during the day. Has pain in achilles every now and then at night.     Left Knee - Follow-up, Injections     F/U L knee inj 3/20/25. Pt states cortisone inj didn't help at all. Doesn't want another one in the future. States he was doing better with out it.        History of present illness:    History of Present Illness  The patient presents for evaluation of right knee pain.    He reports a decrease in swelling in his right knee but notes the presence of scar tissue. He experiences an unusual sensation in the knee, which he describes as akin to a crunching sound. Discontinuation of his muscle relaxant last week has resulted in increased soreness.    Past Medical History:   Diagnosis Date    Arthritis     Hypertension     Laryngeal cancer 1999    Thyroid disease        Past Surgical History:   Procedure Laterality Date    COLONOSCOPY      GASTROSTOMY TUBE PLACEMENT  12/2018    ROBOTIC ARTHROPLASTY, KNEE Right 2/3/2025    Procedure: ROBOTIC ARTHROPLASTY, KNEE, TOTAL;  Surgeon: Jeffrey Francis MD;  Location: [facility];  Service: Orthopedics;  Laterality: Right;    TRACHEAL SURGERY  09/28/2016       Current Outpatient Medications   Medication Sig    aspirin 81 MG Chew Take 1 tablet (81 mg total) by mouth 2 (two) times a day. Blood clot prevention    inositol-choline mzf-rutnu-E-C (LIPO-FLAVONOID) 500 mg Tab Take 1 tablet by mouth once daily.    levothyroxine (SYNTHROID) 50 MCG tablet Take 1 tablet by mouth every morning.    methocarbamoL (ROBAXIN) 750 MG Tab Take 500 mg by mouth 4 (four) times daily.    multivitamin with folic acid 400 mcg Tab Take 1 tablet by mouth every morning.    multivitamin with iron Tab      No current facility-administered  medications for this visit.       Review of patient's allergies indicates:  No Known Allergies    Family History   Problem Relation Name Age of Onset    Hypertension Mother      No Known Problems Father         Social History[1]        Review of Systems:    Constitution: Negative for chills, fever, and sweats.  Negative for unexplained weight loss.    HENT:  Negative for headaches and blurry vision.    Cardiovascular:Negative for chest pain or irregular heart beat. Negative for hypertension.    Respiratory:  Negative for cough and shortness of breath.    Gastrointestinal: Negative for abdominal pain, heartburn, melena, nausea, and vomitting.    Genitourinary:  Negative bladder incontinence and dysuria.    Musculoskeletal:  See HPI    Neurological: Negative for numbness.    Psychiatric/Behavioral: Negative for depression.  The patient is not nervous/anxious.      Endocrine: Negative for polyuria    Hematologic/Lymphatic: Negative for bleeding problem.  Does not bruise/bleed easily.    Skin: Negative for poor would healing and rash      Physical Examination:    Vital Signs:  There were no vitals filed for this visit.    There is no height or weight on file to calculate BMI.    General: No acute distress, alert and oriented, healthy appearing    HEENT: Head is atraumatic, mucous membranes are moist    Neck: Supples, no JVD    Cardiovascular: Palpable dorsalis pedis and posterior tibial pulses, regular rate and rhythm to those pulses    Lungs: Breathing non-labored    Skin: no rashes appreciated    Neurologic: Can flex and extend knees, ankles, and toes. Sensation is grossly intact    Right knee:  Brisk cap refill disappeared sensation intact disappeared range of motion of the right knee is well-maintained.  It had 0 to greater than 120° stable to varus and valgus.  Stable anterior-posterior drawer.    X-rays:      Assessment::  Status post right total knee arthroplasty    Plan:  Overall patient is doing very well.   Much improved from preoperative levels.  It is happy with her recovery.  Left knee is fairly asymptomatic at the time despite has been end-stage osteoarthritis.  It would not wish any further intervention with the disappeared we will see him back in 9 months repeat x-rays of both knees.    This note was generated with the assistance of ambient listening technology. Verbal consent was obtained by the patient and accompanying visitor(s) for the recording of patient appointment to facilitate this note. I attest to having reviewed and edited the generated note for accuracy, though some syntax or spelling errors may persist. Please contact the author of this note for any clarification.      This note was created using [employer] voice recognition software that occasionally misinterpreted phrases or words.    Consult note is delivered via Epic messaging service.         [1]   Social History  Socioeconomic History    Marital status: Unknown   Tobacco Use    Smoking status: Former     Current packs/day: 0.00     Types: Cigarettes     Quit date: 1998     Years since quittin.6    Smokeless tobacco: Never   Substance and Sexual Activity    Alcohol use: No    Drug use: No    Sexual activity: Not Currently     Social Drivers of Health     Financial Resource Strain: Patient Declined (2/3/2025)    Overall Financial Resource Strain (CARDIA)     Difficulty of Paying Living Expenses: Patient declined   Food Insecurity: Patient Declined (2/3/2025)    Hunger Vital Sign     Worried About Running Out of Food in the Last Year: Patient declined     Ran Out of Food in the Last Year: Patient declined   Transportation Needs: Patient Declined (2/3/2025)    TRANSPORTATION NEEDS     Transportation : Patient declined   Stress: Patient Declined (2/3/2025)    Citizen of [place] of Occupational Health - Occupational Stress Questionnaire     Feeling of Stress : Patient declined   Housing Stability: Patient Declined (2/3/2025)    Housing  Stability Vital Sign     Unable to Pay for Housing in the Last Year: Patient declined     Homeless in the Last Year: Patient declined

## 2025-06-17 ENCOUNTER — OFFICE VISIT (OUTPATIENT)
Dept: OTOLARYNGOLOGY | Facility: CLINIC | Age: 75
End: 2025-06-17
Payer: OTHER GOVERNMENT

## 2025-06-17 VITALS — HEART RATE: 72 BPM | DIASTOLIC BLOOD PRESSURE: 73 MMHG | TEMPERATURE: 98 F | SYSTOLIC BLOOD PRESSURE: 111 MMHG

## 2025-06-17 DIAGNOSIS — Z85.21 HISTORY OF CANCER OF LARYNX: ICD-10-CM

## 2025-06-17 DIAGNOSIS — K13.0 LESION OF LIP: Primary | ICD-10-CM

## 2025-06-17 PROCEDURE — 99213 OFFICE O/P EST LOW 20 MIN: CPT | Mod: PBBFAC

## 2025-06-17 PROCEDURE — 31575 DIAGNOSTIC LARYNGOSCOPY: CPT | Mod: PBBFAC | Performed by: STUDENT IN AN ORGANIZED HEALTH CARE EDUCATION/TRAINING PROGRAM

## 2025-06-17 PROCEDURE — 88305 TISSUE EXAM BY PATHOLOGIST: CPT | Mod: TC

## 2025-06-17 RX ORDER — LIDOCAINE HYDROCHLORIDE 40 MG/ML
1 INJECTION, SOLUTION RETROBULBAR
Status: SHIPPED | OUTPATIENT
Start: 2025-06-17

## 2025-06-17 RX ORDER — LEVOTHYROXINE SODIUM 50 UG/1
50 TABLET ORAL
COMMUNITY
Start: 2025-04-29

## 2025-06-17 RX ORDER — IPRATROPIUM BROMIDE AND ALBUTEROL SULFATE 2.5; .5 MG/3ML; MG/3ML
SOLUTION RESPIRATORY (INHALATION)
COMMUNITY
Start: 2024-11-14

## 2025-06-17 NOTE — PROGRESS NOTES
The scope used for the exam was:  Flexible scope ENF-P4  Serial Number:  1)    [id number]    [x]   2)    [id number]    []   3)    [id number]    []   4)    [id number]    []   5)    [id number]    []   6)    [id number]    []     7)    [id number]     []     8)    [id number]     []     9)    [id number]     []    10)  [id number]      []       The scope used for the exam was:  Rigid scope   Serial Number:  1)   [id number]    []   2)   [id number]    []   3)   7330    []   4)    3384   []   5)    0824   []   6)    5554   []     7)   7425    []   8)   2240    []   9)   1109    []

## 2025-06-17 NOTE — PROGRESS NOTES
Patient Name: Toro Bains   YOB: 1950     Chief Complaint:   Chief Complaint   Patient presents with    Follow-up     History of CA of larynx        History of Present Illness:  8/6/2020: Toro Bains is a 70 Years old Male that presents to establish ENT care and follow for his cancer surveillance. history obtained from patient and from chart review in [facility] in . Patient initially with laryngeal cancer that underwent right partial laryngectomy in 1999. Following, he had a recurrence, T3N1M0 that underwent CRT that finished in 2016. Since that time, he has been tracheostomy and PEG dependent. He had seen Dr. Crawford at [facility] for evaluation where he was found to have complete laryngeal/tracheal stenosis and esophageal stenosis. There was a soft tissue density about 2 cm in his right neck through the thyrohyoid membrane, but subsequent PET did not show any avidity. It has been stable since. Since that time, he continues to use trach and PEG without issues. He has a 6.0 CFS in place. He takes 2Cal via PEG. He uses an intraoral electrolarynx. He does note that is having trouble opening his mouth as wide as before. He does jaw range of motion exercise. He denies any sore throat, otalgia, neck masses, fevers, chills, or night sweats. He takes synthroid for hypothyroidism. He states he had labs drawn recently.    1/28/2021: 69y/o male who follows with clinic here at recommendation of primary care physician due to complaints of overheating and over-exertion. Pt reports that he sometimes over-exerts himself when working in the yard or working on different things, and was considering if this could be related to his tracheostomy. States that more recently he has learned to pace himself and he is no longer having any issues with this. Pt states that he does not desire any changes to his tracheostomy at this point and that he is doing well overall with no complaints. He has a 6.0 CFS that  he wears regularly and reports changing 2-3 times a year himself. States that he is using 2cal via PEG and also tolerating this well. Uses intraoral electrolarynx and tolerating well. 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 Denies any other complaints at this time, ROS as below.    11/3/21:: This is a follow-up visit, I'm dictating on 12/2/21 the note was lost. Not recalling having any further problems.    034/1/22: Here today for Cancer Surveillance. His only complaint is that the mucus remains thick in his throat and has to work on getting it out. He is now 6 years post treatment. His weight has remained stable, adequate nourishment through his PEG. He has a functionless larynx.    September 14, 2022:   72-year-old male presents today for follow-up of laryngeal squamous cell carcinoma.  Patient is doing well at this time.  He has no complaints.  He does continue to rely on his PEG tube feeds for his nutrition in his having no problems with this.  In regards to his tracheostomy he does change his inner and outer cannula on an average of every 3 months and does this himself without problems.  He does communicate well with his electrolarynx.    Speaking with him he did indicate that he is on thyroid medication for hypothyroidism and this is monitored by his primary care provider.  He has no other new problems today.    September 14, 2023:   Patient presents today for follow-up of his laryngeal squamous cell carcinoma.  Patient continues to do well.  His only concern is that for the past week he is had a cough which is productive of some yellow sputum through his tracheostomy tube.  He is not had any fever, shortness of breath, hemoptysis, or significant malaise.  He is not had treatment for this as of yet.  Patient does continue to changes tracheostomy by himself at home.  He does continue to  rely on his PEG tube feeds for nutrition and is having no problems with this.  He will sometimes eat things such as ice cream for pleasure.  No other new problems today.    9/24/24:  Patient presents today for routine follow up of his laryngeal squamous cell carcinoma.  He continues to do well.  He continues to perform his own tracheostomy tube changes in care and reports that he has adequate supplies at home.  Of note he is not wearing inner cannula today he reports that he wears it at night but does not do the day due to having to take it out to clean it frequently.  Counseled him extensively on the importance of wearing an inner cannula at all times and the risks of airway obstruction and ultimately potential death if it is not in place.  He continues to take his PEG feed at home for nutrition without issue, his weight has been stable.  He reports occasional dizziness that has resolved with the over-the-counter vitamin.    6/17/25:  Here for follow up for cancer surveillance and trach care.  He is doing well.  Having more crusting around the trachea.  Has not been using humidified.  Otherwise no new or worsening symptoms.  PEG dependent for nutrition, maintaining weight without issues, no difficulty breathing from trach, voicing well with electrolarynx.    Past Medical History:  Past Medical History:   Diagnosis Date    Arthritis     Hypertension     Laryngeal cancer 1999    Thyroid disease      Past Surgical History:   Procedure Laterality Date    COLONOSCOPY      GASTROSTOMY TUBE PLACEMENT  12/2018    ROBOTIC ARTHROPLASTY, KNEE Right 2/3/2025    Procedure: ROBOTIC ARTHROPLASTY, KNEE, TOTAL;  Surgeon: Jeffrey Francis MD;  Location: [facility];  Service: Orthopedics;  Laterality: Right;    TRACHEAL SURGERY  09/28/2016       Review of Systems:  Unremarkable except as mentioned above.    Current Medications:  Current Outpatient Medications   Medication Sig    albuterol-ipratropium (DUO-NEB) 2.5 mg-0.5 mg/3 mL  nebulizer solution Inhale into the lungs.    aspirin 81 MG Chew Take 1 tablet (81 mg total) by mouth 2 (two) times a day. Blood clot prevention    inositol-choline ipj-rvzou-Q-C (LIPO-FLAVONOID) 500 mg Tab Take 1 tablet by mouth once daily.    lactose-reduced food with fibr Liqd Take by mouth.    levothyroxine (SYNTHROID) 50 MCG tablet Take 50 mcg by mouth.    levothyroxine (SYNTHROID) 50 MCG tablet Take 1 tablet by mouth every morning. (Patient not taking: Reported on 6/17/2025)    methocarbamoL (ROBAXIN) 750 MG Tab Take 500 mg by mouth 4 (four) times daily. (Patient not taking: Reported on 6/17/2025)    multivitamin with folic acid 400 mcg Tab Take 1 tablet by mouth every morning. (Patient not taking: Reported on 6/17/2025)    multivitamin with iron Tab  (Patient not taking: Reported on 6/17/2025)     No current facility-administered medications for this visit.        Allergies:  Review of patient's allergies indicates:   Allergen Reactions    Tetanus toxoid Other (See Comments)     Pt is unsure of the reaction    Budesonide-formoterol Rash        Physical Exam:  Vital signs:   Vitals:    06/17/25 1422   BP: 111/73   BP Location: Right arm   Patient Position: Sitting   Pulse: 72   Temp: 98.3 °F (36.8 °C)   TempSrc: Oral   General:  Well-developed well-nourished male in no acute distress.  Patient communicates well using his electrolarynx.    Head and face:  Normocephalic.  No facial lesions.  No temporomandibular joint tenderness or click.  Ears:  Right ear-auricle is normally developed.  External auditory canal is clear.  Tympanic membrane is nonerythematous.  No middle ear effusion.  Left ear-auricle is normally developed.  External auditory canal is clear.  Tympanic membrane is nonerythematous.  No middle ear effusion.  Nose:  Nasal dorsum is unremarkable.  No significant septal deviation.  No significant intranasal congestion.  Secretions are clear.  Oral cavity and oropharynx:  1 cm lower lip mucosal  pigmented and raised lesion present. Tongue and floor mouth are without lesions.  Mucosa is moist but with thick secretions.  No pharyngeal erythema or exudates.  No oropharyngeal masses.  No tonsillar hypertrophy. Mild trismus.  Neck:  6. Cuffless Shiley tracheostomy tube is in place without inner cannula.  No palpable masses.  No adenopathy.  Parotid and submandibular glands are normal to palpation.  Eyes:  Extraocular muscles intact.  No nystagmus.  No exophthalmos or enophthalmos.  Neurologic:  Alert and oriented.  Cranial nerves 2-12 are grossly normal.    Procedure note: Flexible laryngoscopy.  The nose was prepped with 1% phenyleprine nasal spray and tetracaine topically. The flexible fiberoptic laryngoscope was introduced into the right nostril and advanced.  The nasal cavity was patent without significant crusting.  The nasopharynx was clear without any masses or lesions and there was no obstruction of the Eustachian used bilaterally.  Base of tongue was within normal limits although there was some pooling of secretions that did not obstructing exam.  Examination of the larynx showed expected postsurgical changes without concerning lesion or mass.  Hypopharynx and piriform sinuses clear of lesion or masses bilaterally with some nonobstructive secretions.  The flexible scope was then passed through his tracheostomy tube in his trachea was visualized down to the olivier without any erythema or irritation in the were no masses or lesions appreciated    Assessment/Plan:   72-year-old male with history of laryngeal cancer s/p right hemilaryngectomy in 1999, recurrence in larynx in 2015 s/p CRT completed 2016. 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 Esophageal stenosis present and patient is PEG dependent. Last PET 1/2020 along with CT neck and  thorax.  No evidence of disease today.  Patient cares for his tracheostomy tube without issues.  Patient performed his own PEG tubes without issue.    Biopsy of lower lip lesion performed.    Plan:   Biopsy of lower lip mucosal lesion  Will plan for 2 week audio visit to follow up biopsy results  Follow up in 1 year for routine cancer surveillance      Willem Hernandez MD  [employer] Otolaryngology PGY-V

## 2025-06-19 LAB
ESTROGEN SERPL-MCNC: NORMAL PG/ML
INSULIN SERPL-ACNC: NORMAL U[IU]/ML
LAB AP GROSS DESCRIPTION: NORMAL
LAB AP REPORT FOOTNOTES: NORMAL

## 2025-07-02 ENCOUNTER — CLINICAL SUPPORT (OUTPATIENT)
Dept: OTOLARYNGOLOGY | Facility: CLINIC | Age: 75
End: 2025-07-02
Payer: OTHER GOVERNMENT

## 2025-07-02 DIAGNOSIS — Z85.21 HISTORY OF CANCER OF LARYNX: ICD-10-CM

## 2025-07-02 DIAGNOSIS — K13.0 LESION OF LIP: Primary | ICD-10-CM

## 2025-07-02 NOTE — PROGRESS NOTES
Audio Only Telehealth Visit     The patient location is: home  The chief complaint leading to consultation is: lip lesion  Visit type: Virtual visit with audio only (telephone)  Total time spent in medical discussion with patient: 10 minutes  Total time spent on date of the encounter: 15 minutes       The reason for the audio only service rather than synchronous audio and video virtual visit was related to technical difficulties or patient preference/necessity.       Each patient to whom I provide medical services by telemedicine is:  (1) informed of the relationship between the physician and patient and the respective role of any other health care provider with respect to management of the patient; and (2) notified that they may decline to receive medical services by telemedicine and may withdraw from such care at any time. Patient verbally consented to receive this service via voice-only telephone call.       HPI: Called patient to discuss results of lip biopsy, which was benign. Patient reports biopsy site is healing well without pain or bleeding. No other concerns at this time.      Assessment and plan:    72-year-old male with history of laryngeal cancer s/p right hemilaryngectomy in 1999, recurrence in larynx in 2015 s/p CRT completed 2016. 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 Esophageal stenosis present and patient is PEG dependent. Last PET 1/2020 along with CT neck and thorax.  No evidence of disease today.  Patient cares for his tracheostomy tube without issues.  Patient performed his own PEG tubes without issue. Biopsy of lower lip lesion was benign.     -- RTC on 9/24 for cancer surveillance (already scheduled)      Luz Maria Campos, PGY4  [facility]  7/2/2025 3:56 PM       This service was not originating from a related E/M  service provided within the previous 7 days nor will  to an E/M service or procedure within the next 24 hours or my soonest available appointment.  Prevailing standard of care was able to be met in this audio-only visit.

## (undated) DEVICE — GLOVE SENSICARE PI GRN 8.5

## (undated) DEVICE — KIT DRAPE RIO ONE PIECE W/POCK

## (undated) DEVICE — SOL POVIDONE IODINE PCH 3/4OZ

## (undated) DEVICE — SPONGE COTTON TRAY 4X4IN

## (undated) DEVICE — SOL NACL IRR 1000ML BTL

## (undated) DEVICE — DEVICE STRATAFIX SYMMETRIC +

## (undated) DEVICE — KIT VIZADISC KNEE TRACKING

## (undated) DEVICE — GLOVE SENSICARE PI ORTHO LT 8

## (undated) DEVICE — BLADE SAG DUAL CUT 18X90X1.35

## (undated) DEVICE — CUFF ATS 2 PORT SNGL BLDR 34IN

## (undated) DEVICE — WRAP DEMAYO LEG STERILE

## (undated) DEVICE — PADDING WYTEX UNDRCST 6INX4YD

## (undated) DEVICE — DRAPE FULL SHEET 70X100IN

## (undated) DEVICE — KIT TRIATHLON CR FEM PREP SZ5

## (undated) DEVICE — SUT MCRYL PLUS 2-0 CT-1 36IN

## (undated) DEVICE — BLADE MAKO STANDARD

## (undated) DEVICE — PAD ABDOMINAL STERILE 8X10IN

## (undated) DEVICE — DRESSING XEROFORM NONADH 1X8IN

## (undated) DEVICE — PIN BONE 3.2X110MM
Type: IMPLANTABLE DEVICE | Site: KNEE | Status: NON-FUNCTIONAL
Removed: 2025-02-03

## (undated) DEVICE — SUT VICRYL BR 1 GEN 27 CT-1

## (undated) DEVICE — SOL NORMAL USPCA 0.9%

## (undated) DEVICE — DRAPE MEDIUM SHEET 40X70IN

## (undated) DEVICE — COVER TABLE HVY DTY 60X90IN

## (undated) DEVICE — SUT MONOCRYL 3-0 PS-2 UND

## (undated) DEVICE — KIT CHECKPOINT MAKO

## (undated) DEVICE — SYR 10CC LUER LOCK

## (undated) DEVICE — TAPE SILK 3IN

## (undated) DEVICE — CORD SILICONE RETRACTOR

## (undated) DEVICE — GLOVE SENSICARE PI GRN 7

## (undated) DEVICE — ELECTRODE PATIENT RETURN DISP

## (undated) DEVICE — GLOVE SENSICARE PI MICRO 6.5

## (undated) DEVICE — APPLICATOR CHLORAPREP ORN 26ML

## (undated) DEVICE — KIT TRIATHLON CR TIB PREP SZ5

## (undated) DEVICE — Device

## (undated) DEVICE — GOWN POLY REINF X-LONG 2XL

## (undated) DEVICE — DRAPE STERI U-SHAPED 47X51IN

## (undated) DEVICE — CUSHION  WC FOAM 20X20X.75IN

## (undated) DEVICE — PIN FIXATION BONE 140X3.2MM
Type: IMPLANTABLE DEVICE | Site: KNEE | Status: NON-FUNCTIONAL
Removed: 2025-02-03

## (undated) DEVICE — GLOVE SIGNATURE ESSNTL LTX 8.5

## (undated) DEVICE — KIT SURGICAL TURNOVER